# Patient Record
Sex: FEMALE | Race: WHITE | NOT HISPANIC OR LATINO | Employment: FULL TIME | URBAN - METROPOLITAN AREA
[De-identification: names, ages, dates, MRNs, and addresses within clinical notes are randomized per-mention and may not be internally consistent; named-entity substitution may affect disease eponyms.]

---

## 2021-01-14 ENCOUNTER — TRANSFERRED RECORDS (OUTPATIENT)
Dept: HEALTH INFORMATION MANAGEMENT | Facility: CLINIC | Age: 31
End: 2021-01-14
Payer: COMMERCIAL

## 2021-01-29 ENCOUNTER — TRANSFERRED RECORDS (OUTPATIENT)
Dept: HEALTH INFORMATION MANAGEMENT | Facility: CLINIC | Age: 31
End: 2021-01-29

## 2021-01-31 ENCOUNTER — TRANSFERRED RECORDS (OUTPATIENT)
Dept: HEALTH INFORMATION MANAGEMENT | Facility: CLINIC | Age: 31
End: 2021-01-31

## 2021-01-31 LAB
ALT SERPL-CCNC: 24 U/L (ref 14–63)
AST SERPL-CCNC: 12 U/L (ref 15–37)
CREATININE (EXTERNAL): 0.56 MG/DL (ref 0.51–0.95)
GFR ESTIMATED (EXTERNAL): >60 ML/MIN/1.73M^2 (ref 60–150)
GLUCOSE (EXTERNAL): 89 MG/DL (ref 74–100)
POTASSIUM (EXTERNAL): 3.9 MMOL/L (ref 3.5–5.1)

## 2021-02-04 ENCOUNTER — TRANSFERRED RECORDS (OUTPATIENT)
Dept: HEALTH INFORMATION MANAGEMENT | Facility: CLINIC | Age: 31
End: 2021-02-04

## 2021-02-09 ENCOUNTER — TRANSFERRED RECORDS (OUTPATIENT)
Dept: HEALTH INFORMATION MANAGEMENT | Facility: CLINIC | Age: 31
End: 2021-02-09

## 2021-02-11 ENCOUNTER — TRANSFERRED RECORDS (OUTPATIENT)
Dept: HEALTH INFORMATION MANAGEMENT | Facility: CLINIC | Age: 31
End: 2021-02-11

## 2021-02-11 LAB
ALT SERPL-CCNC: 23 U/L (ref 14–63)
AST SERPL-CCNC: 11 U/L (ref 15–37)

## 2021-02-21 ENCOUNTER — TRANSFERRED RECORDS (OUTPATIENT)
Dept: HEALTH INFORMATION MANAGEMENT | Facility: CLINIC | Age: 31
End: 2021-02-21

## 2021-02-21 LAB
ALT SERPL-CCNC: 22 U/L (ref 14–63)
AST SERPL-CCNC: 9 U/L (ref 15–37)
CHOLESTEROL (EXTERNAL): 159 MG/DL (ref 100–200)
CREATININE (EXTERNAL): 0.53 MG/DL (ref 0.51–0.95)
GFR ESTIMATED (EXTERNAL): >60 ML/MIN/1.73ME2
GLUCOSE (EXTERNAL): 98 MG/DL (ref 74–100)
HDLC SERPL-MCNC: 44 MG/DL (ref 45–60)
LDL CHOLESTEROL (EXTERNAL): 88 MG/DL (ref 1–129)
NON HDL CHOLESTEROL (EXTERNAL): 115 MG/DL (ref 0–130)
POTASSIUM (EXTERNAL): 4 MMOL/L (ref 3.5–5.1)
TRIGLYCERIDES (EXTERNAL): 134 MG/DL (ref 35–150)

## 2021-02-22 ENCOUNTER — TRANSFERRED RECORDS (OUTPATIENT)
Dept: HEALTH INFORMATION MANAGEMENT | Facility: CLINIC | Age: 31
End: 2021-02-22

## 2021-02-22 LAB
ALT SERPL-CCNC: 24 U/L (ref 14–63)
AST SERPL-CCNC: 11 U/L (ref 15–37)
CREATININE (EXTERNAL): 0.59 MG/DL (ref 0.51–0.95)
GFR ESTIMATED (EXTERNAL): >60 ML/MIN/1.73ME2 (ref 60–150)
GLUCOSE (EXTERNAL): 88 MG/DL (ref 74–100)
POTASSIUM (EXTERNAL): 4.2 MMOL/L (ref 3.5–5.1)

## 2021-02-23 ENCOUNTER — TRANSFERRED RECORDS (OUTPATIENT)
Dept: HEALTH INFORMATION MANAGEMENT | Facility: CLINIC | Age: 31
End: 2021-02-23

## 2021-03-03 ENCOUNTER — TRANSFERRED RECORDS (OUTPATIENT)
Dept: HEALTH INFORMATION MANAGEMENT | Facility: CLINIC | Age: 31
End: 2021-03-03

## 2021-03-03 LAB
CHOLESTEROL (EXTERNAL): 159 MG/DL (ref 100–200)
HDLC SERPL-MCNC: 47 MG/DL (ref 45–60)
LDL CHOLESTEROL (EXTERNAL): 101 MG/DL (ref 1–129)
NON HDL CHOLESTEROL (EXTERNAL): 112 MG/DL (ref 0–130)
TRIGLYCERIDES (EXTERNAL): 57 MG/DL (ref 35–150)

## 2021-03-04 ENCOUNTER — TRANSFERRED RECORDS (OUTPATIENT)
Dept: HEALTH INFORMATION MANAGEMENT | Facility: CLINIC | Age: 31
End: 2021-03-04

## 2021-03-04 LAB
ALT SERPL-CCNC: 47 U/L (ref 14–63)
AST SERPL-CCNC: 17 U/L (ref 15–37)
CREATININE (EXTERNAL): 0.58 MG/DL (ref 0.51–0.95)
GFR ESTIMATED (EXTERNAL): >60 ML/MIN/1.73M2 (ref 60–150)
GLUCOSE (EXTERNAL): 154 MG/DL (ref 74–100)
POTASSIUM (EXTERNAL): 3.5 MMOL/L (ref 3.5–5.1)

## 2021-03-15 ENCOUNTER — TRANSFERRED RECORDS (OUTPATIENT)
Dept: HEALTH INFORMATION MANAGEMENT | Facility: CLINIC | Age: 31
End: 2021-03-15

## 2021-03-24 ENCOUNTER — TRANSFERRED RECORDS (OUTPATIENT)
Dept: HEALTH INFORMATION MANAGEMENT | Facility: CLINIC | Age: 31
End: 2021-03-24

## 2021-04-01 ENCOUNTER — TRANSFERRED RECORDS (OUTPATIENT)
Dept: HEALTH INFORMATION MANAGEMENT | Facility: CLINIC | Age: 31
End: 2021-04-01

## 2021-04-01 LAB
ALT SERPL-CCNC: 15 IU/L (ref 0–55)
AST SERPL-CCNC: 13 IU/L (ref 5–34)
CREATININE (EXTERNAL): 0.72 MG/DL (ref 0.6–1.2)
GFR ESTIMATED (EXTERNAL): >60 ML/MIN/1.73M2
GFR ESTIMATED (IF AFRICAN AMERICAN) (EXTERNAL): >60 ML/MIN/1.73M2
GLUCOSE (EXTERNAL): 87 MG/DL (ref 70–110)
POTASSIUM (EXTERNAL): 4.3 MMOL/L (ref 3.5–5.1)

## 2021-04-23 ENCOUNTER — TRANSFERRED RECORDS (OUTPATIENT)
Dept: HEALTH INFORMATION MANAGEMENT | Facility: CLINIC | Age: 31
End: 2021-04-23

## 2021-05-09 ENCOUNTER — TRANSFERRED RECORDS (OUTPATIENT)
Dept: HEALTH INFORMATION MANAGEMENT | Facility: CLINIC | Age: 31
End: 2021-05-09

## 2021-05-21 ENCOUNTER — TRANSFERRED RECORDS (OUTPATIENT)
Dept: HEALTH INFORMATION MANAGEMENT | Facility: CLINIC | Age: 31
End: 2021-05-21

## 2021-05-21 LAB
ALT SERPL-CCNC: 43 U/L (ref 0–32)
AST SERPL-CCNC: 16 U/L (ref 0–40)
CREATININE (EXTERNAL): 0.36 MG/DL (ref 0.57–1)
GFR ESTIMATED (EXTERNAL): 145 ML/MIN/1.73M2
GFR ESTIMATED (IF AFRICAN AMERICAN) (EXTERNAL): 167 ML/MIN/1.73M2
GLUCOSE (EXTERNAL): 97 MG/DL (ref 65–99)
POTASSIUM (EXTERNAL): 4.6 MMOL/L (ref 3.5–5.2)

## 2021-06-09 ENCOUNTER — TRANSFERRED RECORDS (OUTPATIENT)
Dept: HEALTH INFORMATION MANAGEMENT | Facility: CLINIC | Age: 31
End: 2021-06-09

## 2021-07-13 ENCOUNTER — VIRTUAL VISIT (OUTPATIENT)
Dept: GASTROENTEROLOGY | Facility: CLINIC | Age: 31
End: 2021-07-13
Payer: COMMERCIAL

## 2021-07-13 DIAGNOSIS — R10.11 ABDOMINAL PAIN, RIGHT UPPER QUADRANT: Primary | ICD-10-CM

## 2021-07-13 DIAGNOSIS — K85.00 IDIOPATHIC ACUTE PANCREATITIS, UNSPECIFIED COMPLICATION STATUS: ICD-10-CM

## 2021-07-13 PROCEDURE — 99204 OFFICE O/P NEW MOD 45 MIN: CPT | Mod: 95 | Performed by: INTERNAL MEDICINE

## 2021-07-13 RX ORDER — TRAMADOL HYDROCHLORIDE 50 MG/1
50 TABLET ORAL EVERY 6 HOURS PRN
COMMUNITY

## 2021-07-13 NOTE — PROGRESS NOTES
Clara is a 31 year old who is being evaluated via a billable video visit.      How would you like to obtain your AVS? Mail a copy  If the video visit is dropped, the invitation should be resent by: Text to cell phone: [phone]  Will anyone else be joining your video visit? No      Video Start Time:   Video-Visit Details    Type of service:  Video Visit    Video End Time:    Originating Location (pt. Location):     Distant Location (provider location):  [facility]     Platform used for Video Visit:   Vito Sandoval CMA

## 2021-07-14 NOTE — PROGRESS NOTES
GASTROENTEROLOGY NEW PATIENT VIDEO VISIT    CC/REFERRING MD:    No Ref-Primary, Physician  Referred Self    REASON FOR CONSULTATION:   Referred Self for   Chief Complaint   Patient presents with     New Patient      Pancreatitis // pain is bad 8 // taking tramadol to take edge off-- is working a little // upper abdominal to back       HISTORY OF PRESENT ILLNESS:    Clara Conway is a 31 year old female who is being evaluated via a billable video visit.      We did a consultation for evaluation of abdominal pain.  Evaluation is limited by lack of records today from prior work-up.  She reports that she had the onset of abdominal pain and chest pain in February of last year.  She sought evaluation for this in the emergency department.  She notes that she had a slightly elevated lipase level around that time and it was around 187 with a normal range of 0-78.  CT was unremarkable.  She then had several months where she felt quite well and then again developed abdominal pain, right shoulder pain and back pain she reports that she went to the emergency department and was noted to have a lipase level of approximately 2000.  She reports that she did get a HIDA scan around that time which noted a low gallbladder ejection fraction and then she underwent a cholecystectomy.  This did not resolve the pain.  She reports that she had 2 CT scans, 2 MRCP is, 1 in [place] in 1 month [place] and these did not note any abnormality.  She had a colonoscopy and this was unremarkable.  She saw Dr. Waddell and she had an EUS which noted a mildly dilated bile duct with possibly some sludge (we do not have the report) and then was recommended to undergo ERCP and she did this but she reports she had a bile duct cleaned out and a stent placed.  She then had a follow-up and the stent was removed.  She still had pain and had another EUS and reports then she was diagnosed with mild chronic pancreatitis by EUS.  She was prescribed Creon,  dose unclear but it did not significantly improve her symptoms.  She then was referred here for further evaluation.  She continues to have abdominal pain which is constant in nature but does intermittently become worse with eating.  She occasionally has diarrhea with fatty stools and it is worse when she is having pain.  She notes that she was checked for H. pylori in the past and this was negative.  She notes that her health is otherwise good.  She does not have any other major medical issues and no other pertinent surgical history.  She does not have a family history of pancreatitis or pancreatic cancer.  She drinks minimal alcohol, none recently and formerly smoked but quit a few years back and was never a heavy smoker.  She takes tramadol for the abdominal pain but no other regular medications and no over-the-counter medications.    I have reviewed and updated the patient's Past Medical History, Social History, Family History and Medication List.    Exam:    General appearance:  Healthy appearing adult, in no acute distress  Eyes:  Sclera anicteric, Pupils round and reactive to light  Ears, nose, mouth and throat:  No obvious external lesions of ears and nose.  Hearing intact  Neck:  Symmetric, No obvious external lesions  Respiratory:  Normal respiration, no use of accessory muscles   MSK:  No visual upper extremity, neck or facial muscle atrophy  ABD:  No visual abdominal distention, no audible borborygmi  Skin:  No rashes or jaundice   Psychiatric:  Oriented to person, place and time, Appropriate mood and affect.   Neurologic:  Peripheral muscle function and dexterity appear to be intact      PERTINENT STUDIES have been reviewed.    ASSESSMENT/PLAN:    Clara Conway is a 31 year old female who presents for evaluation of chronic right upper quadrant abdominal pain with radiation to the back and the right shoulder.  This occurs in the setting of prior cholecystectomy, prior ERCP, and EUS noting mild  chronic pancreatitis per her report.  We do not have any of the prior medical records.  She notes that she has had 2 MRCP is in 2 CT scans which did not note any chronic pancreatitis.  However, she has had acute pancreatitis with lipase level greater than 2000.  We discussed today that a diagnosis of mild chronic pancreatitis by EUS can be problematic in the absence of supporting objective findings and we should try to confirm this diagnosis before attributing her abdominal pain to this over the long-term.  I suggest that we repeat basic laboratory studies with LFTs, pancreatic enzyme levels and also get a fecal elastase level at this time.  I would also like to get a secretin stimulated MRCP to see if that can show any evidence of chronic pancreatitis.  If this confirms this diagnosis, then we can manage for this over the long-term but in the absence of clear evidence, another diagnosis such as a postcholecystectomy syndrome, functional right upper quadrant pain may also be quite possible in the absence of negative work-up and thus neuromodulator type therapy, GI psychology etc. may provide more benefit than management of pancreatic enzymes and/or other pancreatic directed therapy.  We have ordered labs and the MRCP and then will follow up back in the office after we get the studies and all the results of her prior evaluations.      Video-Visit Details    Type of service:  Video Visit    Total Video Time: 32 min    Originating Location (pt. Location): Home    Distant Location (provider location):  [facility]     Mode of Communication:  Video Conference via [employer]    David Edwards MD    RTC 2 months    Thank you for this consultation.  It was a pleasure to participate in the care of this patient; please contact us with any further questions.  A total of 46 minutes was spent with reviewing the chart, discussing with the patient, documentation and coordination of  care.    This note was created with voice recognition software, and while reviewed for accuracy, typos may remain.     David Edwards MD  Division of Gastroenterology, Hepatology and Nutrition  [facility]  [phone]

## 2021-07-19 ENCOUNTER — TELEPHONE (OUTPATIENT)
Dept: GASTROENTEROLOGY | Facility: CLINIC | Age: 31
End: 2021-07-19

## 2021-07-19 NOTE — TELEPHONE ENCOUNTER
Writer called patient to discuss getting consent from [facility] and from [facility] in [place].  LVM for patient to call back.  Copy of Dr Edwards note below:      Can I get records for this patient?     She has records at [place] and [place] with CT scan, labs and MRCP     She has records at [facility] with 2 different EUS and 1 ERCP and office visit.     Thanks, MD Vito Eason, [facility]

## 2021-07-19 NOTE — TELEPHONE ENCOUNTER
Writer called [facility] and [facility] at Dr Edwards request to obtain records for patient including MRCP,labs,CT scans and any GI related records.  Writer called both labs and faxed records requests to both [place] and [place] to obtain records.  Vito Sandoval CMA

## 2021-07-20 ENCOUNTER — TELEPHONE (OUTPATIENT)
Dept: GASTROENTEROLOGY | Facility: CLINIC | Age: 31
End: 2021-07-20

## 2021-07-20 NOTE — TELEPHONE ENCOUNTER
[facility] Call Center    Phone Message    May a detailed message be left on voicemail: yes     Reason for Call: Other: Patient has a MRCP scheduled for 8-2 and wants to get in sooner if able.  Please call to discuss, as the referral was from Dr Edwards.      Action Taken: Message routed to:  [facility]  GI    Travel Screening: Not Applicable

## 2021-07-21 NOTE — TELEPHONE ENCOUNTER
Patient contacted, states that she was scheduled for 8/2/2021, but is in a lot of pain and was hoping that she would be able to be seen sooner.  Informed patient that this writer is unsure if other locations are able to do the MRCP with secretin, would need to contact other locations and inquire.  Patient is wondering if there is anything that Dr. Edwards can do to make this occur sooner.  Patient inquired if outside records have been received, informed that there are outside records scanned into her EMR.      Yue Werner RN

## 2021-07-23 NOTE — TELEPHONE ENCOUNTER
David Edwards MD  You 20 hours ago (3:48 PM)   CS  I will not be able to change the appointment for MRI.     She could see her primary care or consider an appointment with the chronic pain clinic if ongoing pain is an issue.     After MRI, I will be able to evaluate if there is anything that we will be able to intervene upon from a GI standpoint which could address pain      Attempted to reach patient, message left requesting return call.       Yue Werner RN

## 2021-07-30 NOTE — TELEPHONE ENCOUNTER
Writer received records from [facility] for patient requested by Dr Edwards. All records faxed to stat scan for Dr Edwards' review, patient has procedure Monday with Dr. Edwards.  [place] records already scanned in.  Vito Sandoval CMA

## 2021-08-02 ENCOUNTER — HOSPITAL ENCOUNTER (OUTPATIENT)
Dept: MRI IMAGING | Facility: CLINIC | Age: 31
Discharge: HOME | End: 2021-08-02
Attending: INTERNAL MEDICINE | Admitting: INTERNAL MEDICINE
Payer: COMMERCIAL

## 2021-08-02 DIAGNOSIS — K85.00 IDIOPATHIC ACUTE PANCREATITIS, UNSPECIFIED COMPLICATION STATUS: ICD-10-CM

## 2021-08-02 DIAGNOSIS — R10.11 ABDOMINAL PAIN, RIGHT UPPER QUADRANT: ICD-10-CM

## 2021-08-02 PROCEDURE — A9585 GADOBUTROL INJECTION: HCPCS | Performed by: INTERNAL MEDICINE

## 2021-08-02 PROCEDURE — 74183 MRI ABD W/O CNTR FLWD CNTR: CPT | Mod: 26 | Performed by: RADIOLOGY

## 2021-08-02 PROCEDURE — 999N000128 HC STATISTIC PERIPHERAL IV START W/O US GUIDANCE

## 2021-08-02 PROCEDURE — 250N000009 HC RX 250: Performed by: INTERNAL MEDICINE

## 2021-08-02 PROCEDURE — 74183 MRI ABD W/O CNTR FLWD CNTR: CPT

## 2021-08-02 PROCEDURE — 255N000002 HC RX 255 OP 636: Performed by: INTERNAL MEDICINE

## 2021-08-02 PROCEDURE — 250N000011 HC RX IP 250 OP 636: Performed by: INTERNAL MEDICINE

## 2021-08-02 RX ORDER — GADOBUTROL 604.72 MG/ML
10 INJECTION INTRAVENOUS ONCE
Status: COMPLETED | OUTPATIENT
Start: 2021-08-02 | End: 2021-08-02

## 2021-08-02 RX ADMIN — HUMAN SECRETIN 0.2 MCG: 16 INJECTION, POWDER, LYOPHILIZED, FOR SOLUTION INTRAVENOUS at 13:40

## 2021-08-02 RX ADMIN — HUMAN SECRETIN 15.8 MCG: 16 INJECTION, POWDER, LYOPHILIZED, FOR SOLUTION INTRAVENOUS at 13:42

## 2021-08-02 RX ADMIN — GADOBUTROL 10 ML: 604.72 INJECTION INTRAVENOUS at 14:15

## 2021-08-03 ENCOUNTER — TELEPHONE (OUTPATIENT)
Dept: GASTROENTEROLOGY | Facility: CLINIC | Age: 31
End: 2021-08-03

## 2021-08-03 NOTE — TELEPHONE ENCOUNTER
Received the below message from provider. LPN spoke with patient and notified her of results and recommendations. Patient verbalized understanding and requested that lab orders be faxed to [facility] lab. Lab rax number [phone]. Lab orders faxed with the request that results be faxed to [facility].    SHAY Knutson Christopher Dale, MD  [facility]  Can we please let the patient know her MRI results.  We don't see any definitive sign of chronic pancreatitis on this MRI.   The bile duct changes are consistent with her having her gallbladder out.  I would suggest that she get the stool studies done that we ordered in order to complete the evaluation to see if any pancreas issues could be contributing to her pain. Please let me know if any questions.     IMPRESSION:       1.  Minimal intra and extrahepatic biliary dilatation is likely   reservoir effect of post cholecystectomy status. No evidence of   obstructing mass or stone   2.  Pancreas shows no structural abnormalities and normal response to   secretin.       I have personally reviewed the examination and initial interpretation   and I agree with the findings.       SHANNAN SHAIKH, DO

## 2021-08-04 ENCOUNTER — TRANSFERRED RECORDS (OUTPATIENT)
Dept: HEALTH INFORMATION MANAGEMENT | Facility: CLINIC | Age: 31
End: 2021-08-04

## 2021-08-04 LAB
ALT SERPL-CCNC: 16 IU/L (ref 0–55)
AST SERPL-CCNC: 12 IU/L (ref 5–34)
CREATININE (EXTERNAL): 0.69 MG/DL (ref 0.5–1.2)
GFR ESTIMATED (EXTERNAL): >60 ML/MIN/1.73M2
GFR ESTIMATED (IF AFRICAN AMERICAN) (EXTERNAL): >60 ML/MIN/1.73M2
GLUCOSE (EXTERNAL): 94 MG/DL (ref 70–110)
POTASSIUM (EXTERNAL): 4.5 MMOL/L (ref 3.5–5.1)

## 2021-08-04 NOTE — TELEPHONE ENCOUNTER
No return call, MRI completed.  See encounter dated 8/4/2021 for further recommendations.      Yue Werner RN

## 2021-08-16 ENCOUNTER — TELEPHONE (OUTPATIENT)
Dept: GASTROENTEROLOGY | Facility: CLINIC | Age: 31
End: 2021-08-16

## 2021-08-16 NOTE — TELEPHONE ENCOUNTER
Writer received fax from [facility] for patient.  Records faxed to stat scan and Dr Edwards notified.  Vito Sandoval CMA

## 2021-08-20 ENCOUNTER — TELEPHONE (OUTPATIENT)
Dept: GASTROENTEROLOGY | Facility: CLINIC | Age: 31
End: 2021-08-20

## 2021-08-20 NOTE — TELEPHONE ENCOUNTER
[facility] Call Center    Phone Message    May a detailed message be left on voicemail: yes     Reason for Call: Other: Per pt is just wondering if  or care team have recieved the results of her labs and tests. Please call pt back once recieve. Thank you.      Action Taken: Message routed to:  [facility] Gastro    Travel Screening: Not Applicable

## 2021-08-25 NOTE — TELEPHONE ENCOUNTER
Grace, David Yañez MD  You 2 days ago   CS  I have reviewed all of the data.  The stool tests and the blood work appear normal, no sign of pancreatic insufficiency or pancreatitis.  The MRI here also did not reveal any sign of chronic pancreatitis.       I do not think there is a pancreatic cause of the abdominal pain.  If she wants, we can do a follow up visit to discuss medication/management options for the chronic abdominal pain.     David Edwards MD      Detailed voice mail left for patient with provider advice.   Requested return call if questions or concerns regarding this information.     Yue Werner RN

## 2021-09-22 ENCOUNTER — TRANSCRIBE ORDERS (OUTPATIENT)
Dept: OTHER | Age: 31
End: 2021-09-22

## 2021-09-22 DIAGNOSIS — R10.10 CHRONIC UPPER ABDOMINAL PAIN: Primary | ICD-10-CM

## 2021-09-22 DIAGNOSIS — G89.29 CHRONIC UPPER ABDOMINAL PAIN: Primary | ICD-10-CM

## 2021-09-28 ENCOUNTER — TELEPHONE (OUTPATIENT)
Dept: GASTROENTEROLOGY | Facility: CLINIC | Age: 31
End: 2021-09-28

## 2021-09-28 NOTE — TELEPHONE ENCOUNTER
"[facility]     Referring provider:  Carman Melanie Santiago    Referred to: [facility]     Provider Requested:  Dr. Randall    Referral Received:  9/23/21     Records received: in CareEverywhere     Images received: sMRCP in PACs, read in Epic    Evaluation for: abd pain     Clinical History (per RN review):     Pt saw Dr Coronado's in August, his follow up response via [employer]:  I have reviewed all of the data.  The stool tests and the blood work appear normal, no sign of pancreatic insufficiency or pancreatitis.  The MRI here also did not reveal any sign of chronic pancreatitis.       I do not think there is a pancreatic cause of the abdominal pain.  If she wants, we can do a follow up visit to discuss medication/management options for the chronic abdominal pain.     Referring MD now referring to Dr. Randall, specifically requesting you comment on pain management.     \"Ask Dr. Randall if we need to increase your amitriptyline, or if he wants me to add Cymbalta or if he wants to do it. \"    Also saw Amanuel Knott in July    Mild elevations of lipase in the past:  08/04/2021 04/29/2021 04/17/2021 04/09/2021 04/01/2021 03/04/2021 12/15/2020 11/24/2020 08/18/2020 03/04/2020 02/05/2020 08/30/2019                 LIPASE 82.0High     121.0High     103.0High     126.0High     160.0High     312.0High     117.0High                 MD review date: 9/28/21  MD Decision for clinic consultation/Orders:            Referral updates/Patient contacted:     "

## 2021-10-01 ENCOUNTER — TELEPHONE (OUTPATIENT)
Dept: GASTROENTEROLOGY | Facility: CLINIC | Age: 31
End: 2021-10-01

## 2021-10-01 NOTE — TELEPHONE ENCOUNTER
LVM (no mychart). GI Referral, schedule in [facility]bili:  MD:Dr. Randall   Virtual visit  Referring MD:Melanie Santacruz   RE: functional abdominal pain, elevated lipase

## 2021-11-10 ENCOUNTER — VIRTUAL VISIT (OUTPATIENT)
Dept: GASTROENTEROLOGY | Facility: CLINIC | Age: 31
End: 2021-11-10
Attending: PHYSICIAN ASSISTANT
Payer: COMMERCIAL

## 2021-11-10 VITALS — HEIGHT: 70 IN | BODY MASS INDEX: 30.92 KG/M2 | WEIGHT: 216 LBS

## 2021-11-10 DIAGNOSIS — R10.10 CHRONIC UPPER ABDOMINAL PAIN: ICD-10-CM

## 2021-11-10 DIAGNOSIS — G89.29 CHRONIC UPPER ABDOMINAL PAIN: ICD-10-CM

## 2021-11-10 PROCEDURE — 99215 OFFICE O/P EST HI 40 MIN: CPT | Mod: 95 | Performed by: INTERNAL MEDICINE

## 2021-11-10 ASSESSMENT — MIFFLIN-ST. JEOR: SCORE: 1780.02

## 2021-11-10 NOTE — LETTER
11/10/2021         RE: Clara Chávez  [id number] [address] [place] [address]        Dear Colleague,    Thank you for referring your patient, Clara Chávez, to the [facility]. Please see a copy of my visit note below.    Clara is a 31 year old who is being evaluated via a billable video visit.      How would you like to obtain your AVS? Mail a copy  If the video visit is dropped, the invitation should be resent by: Text to cell phone: [phone]  Will anyone else be joining your video visit? No     Video Start Time: 11:20 AM  Video-Visit Details    Type of service:  Video Visit       Start: 11/10/2021 10:29 am   Stop: 11/10/2021 11:20 am  Originating Location (pt. Location):Car    Distant Location (provider location):  [facility]     Platform used for Video Visit: AmWell     Referred at own request by Dr Edwards, followed by Melanie Mack PA-C her primary. Has seen Dr Waddell (EUS, ERCP x 2), then Dr Edwards at our own [facility] clinic. Excellent note from Dr Edwards below.    Related to LIPASE  Component      LIPASE     Component 08/04/2021 04/29/2021 04/17/2021 04/09/2021 04/01/2021 03/04/2021 12/15/2020 11/24/2020 08/18/2020 03/04/2020 02/05/2020 08/30/2019                  LIPASE 82.0 High     121.0 High     103.0 High     126.0 High     160.0 High     312.0 High     117.0 High     203.0 High             Feb 2021 Lipase [facility] 2 nights.  Multiple ED visits  Nov 2020, pain, persistent pain since  Pain radiates to back, right side..  Nonfunctioning HIDA, gallbladder Jan 2021.   No relief  EUS, ERCP, sphincterotomies, no improvement, now worse.   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        IMP: at least two episodes cw acute pancreatitis, per Benson criteria, w pain and >3x uln, maybe more. All  imaging normal. Equivocal EUS at [facility] unreliable test and not available to us. Persistent troubling pain, that radiates to back, RUQ. No response or worse after wanda then ERCPs, during which we dont know what was done, but probably sphincterotomies. No complication, at least.    This is possibly consistent w chronic pancreatic pain, which can occur after documented bouts of acute pancreatitis, in absence of structural changes or chronic pancreatitis, or EPI (had normal elastase). All work up by Dr Edwards srinivasan secretin MRCP exactly appropriate. May have genetic  (SPINK1, CFTR etc) which would help explain. Whatever cause will offer her supportive care, pain clinic referral, IV fluid therapy, health psych. Ultimately may discuss repeat ERCP to see if sphincters actually ablated.       1) No more CT scans  2) Infusion therapy 3 x [place]  3) Dietitian  4) Health Psycholgy  5) Genetic eval Emily Ortiz  6) Follow-up Dr Randall   7) Pain referral to Dr PARMJIT Shell  8) Carafate 1gr qid  9) Gabapentin or Lyrica per Melanie Randall M.D., [facility] [place]  Professor of Medicine   Division of Gastroenterology, Hepatology and Nutrition   [facility] [address]  [address] [place] [address]  Study Result    Narrative & Impression   EXAMINATION: MR ABDOMEN MRCP WO & W CONTRAST W SECRETIN,  8/2/2021 2:16 PM     COMPARISON: None     HISTORY: reported history of chronic pancreatitis by outside EUS.   please do secretin stimulated mrcp to look for chronic pancreatitis;  Abdominal pain, right upper quadrant; Idiopathic acute pancreatitis,  unspecified complication status     TECHNIQUE: Coronal T2 HASTE, sagittal T2 HASTE, axial T2 STIR, axial  ROBYN T2, In-phase and Out-of-phase axial breath-hold FLASH,  diffusion-weighted, and T1-weighted VIBE axial fat saturation images  were obtained. Thick and thin slab heavily T2-weighted MRCP images  were obtained. Following administration of mcg of secretin,  T2-weighted  HASTE images were obtained at 1 minute intervals to 7  minutes, and an additional 10 minutes image was also obtained.  Contrast dose: 10mL Gadavist     FINDINGS:      Gallbladder/Biliary Tree: Status post cholecystectomy. Common bile  duct measures up to 9 mm without evidence of obstructing mass or  stone. There is minimal intrahepatic biliary dilatation..      Pancreas: Normal T1 parenchymal signal. There is no pancreas divisum.  The pancreatic duct  does not appear abnormally dilated, and  no  sidebranches are visualized . The ventral pancreatic duct is not well  seen. No focal lesions are identified in the pancreas. There is  minimal dilatation of the pancreatic duct after secretin, and  subsequent increased fluid signal in the duodenum.     Liver: Unremarkable. No suspicious masses. No hepatic steatosis.      Spleen: Unremarkable.     Adrenal glands: Unremarkable.     Kidneys: Unremarkable. No hydronephrosis.     Bowel: Unremarkable. No obstruction.     Lymph nodes: No bulky lymphadenopathy.     Blood vessels: Unremarkable.     Lung bases: Unremarkable.     Bones and soft tissues: Unremarkable. No suspicious lesions.     Mesentery and abdominal wall: Unremarkable.     Ascites: None.                                                                      IMPRESSION:     1.  Minimal intra and extrahepatic biliary dilatation is likely  reservoir effect of post cholecystectomy status. No evidence of  obstructing mass or stone   2.  Pancreas shows no structural abnormalities and normal response to  secretin.     I have personally reviewed the examination and initial interpretation  and I agree with the findings.     SHANNAN SHAIKH, DO        GASTROENTEROLOGY NEW PATIENT VIDEO VISIT     CC/REFERRING MD:    No Ref-Primary, Physician  Referred Self     REASON FOR CONSULTATION:   Referred Self for        Chief Complaint   Patient presents with     New Patient        Pancreatitis // pain is bad 8 // taking tramadol to take  edge off-- is working a little // upper abdominal to back         HISTORY OF PRESENT ILLNESS:     Clara Conway is a 31 year old female who is being evaluated via a billable video visit.       We did a consultation for evaluation of abdominal pain.  Evaluation is limited by lack of records today from prior work-up.  She reports that she had the onset of abdominal pain and chest pain in February of last year.  She sought evaluation for this in the emergency department.  She notes that she had a slightly elevated lipase level around that time and it was around 187 with a normal range of 0-78.  CT was unremarkable.  She then had several months where she felt quite well and then again developed abdominal pain, right shoulder pain and back pain she reports that she went to the emergency department and was noted to have a lipase level of approximately 2000.  She reports that she did get a HIDA scan around that time which noted a low gallbladder ejection fraction and then she underwent a cholecystectomy.  This did not resolve the pain.  She reports that she had 2 CT scans, 2 MRCP is, 1 in [place] in 1 month [place] and these did not note any abnormality.  She had a colonoscopy and this was unremarkable.  She saw Dr. Waddell and she had an EUS which noted a mildly dilated bile duct with possibly some sludge (we do not have the report) and then was recommended to undergo ERCP and she did this but she reports she had a bile duct cleaned out and a stent placed.  She then had a follow-up and the stent was removed.  She still had pain and had another EUS and reports then she was diagnosed with mild chronic pancreatitis by EUS.  She was prescribed Creon, dose unclear but it did not significantly improve her symptoms.  She then was referred here for further evaluation.  She continues to have abdominal pain which is constant in nature but does intermittently become worse with eating.  She occasionally has diarrhea with fatty  stools and it is worse when she is having pain.  She notes that she was checked for H. pylori in the past and this was negative.  She notes that her health is otherwise good.  She does not have any other major medical issues and no other pertinent surgical history.  She does not have a family history of pancreatitis or pancreatic cancer.  She drinks minimal alcohol, none recently and formerly smoked but quit a few years back and was never a heavy smoker.  She takes tramadol for the abdominal pain but no other regular medications and no over-the-counter medications.     I have reviewed and updated the patient's Past Medical History, Social History, Family History and Medication List.     Exam:    General appearance:  Healthy appearing adult, in no acute distress  Eyes:  Sclera anicteric, Pupils round and reactive to light  Ears, nose, mouth and throat:  No obvious external lesions of ears and nose.  Hearing intact  Neck:  Symmetric, No obvious external lesions  Respiratory:  Normal respiration, no use of accessory muscles   MSK:  No visual upper extremity, neck or facial muscle atrophy  ABD:  No visual abdominal distention, no audible borborygmi  Skin:  No rashes or jaundice   Psychiatric:  Oriented to person, place and time, Appropriate mood and affect.   Neurologic:  Peripheral muscle function and dexterity appear to be intact        PERTINENT STUDIES have been reviewed.     ASSESSMENT/PLAN:     Clara Conway is a 31 year old female who presents for evaluation of chronic right upper quadrant abdominal pain with radiation to the back and the right shoulder.  This occurs in the setting of prior cholecystectomy, prior ERCP, and EUS noting mild chronic pancreatitis per her report.  We do not have any of the prior medical records.  She notes that she has had 2 MRCP is in 2 CT scans which did not note any chronic pancreatitis.  However, she has had acute pancreatitis with lipase level greater than 2000.  We  discussed today that a diagnosis of mild chronic pancreatitis by EUS can be problematic in the absence of supporting objective findings and we should try to confirm this diagnosis before attributing her abdominal pain to this over the long-term.  I suggest that we repeat basic laboratory studies with LFTs, pancreatic enzyme levels and also get a fecal elastase level at this time.  I would also like to get a secretin stimulated MRCP to see if that can show any evidence of chronic pancreatitis.  If this confirms this diagnosis, then we can manage for this over the long-term but in the absence of clear evidence, another diagnosis such as a postcholecystectomy syndrome, functional right upper quadrant pain may also be quite possible in the absence of negative work-up and thus neuromodulator type therapy, GI psychology etc. may provide more benefit than management of pancreatic enzymes and/or other pancreatic directed therapy.  We have ordered labs and the MRCP and then will follow up back in the office after we get the studies and all the results of her prior evaluations.        Video-Visit Details     Type of service:  Video Visit     Total Video Time: 32 min    Originating Location (pt. Location): Home     Distant Location (provider location):  [facility]      Mode of Communication:  Video Conference via [email or web address]     David Edwards MD     RTC 2 months     Thank you for this consultation.  It was a pleasure to participate in the care of this patient; please contact us with any further questions.  A total of 46 minutes was spent with reviewing the chart, discussing with the patient, documentation and coordination of care.     This note was created with voice recognition software, and while reviewed for accuracy, typos may remain.      David Edwards MD  Division of Gastroenterology, Hepatology and Nutrition  [facility]  [phone]                Again, thank you for allowing me to participate in the care of your patient.        Sincerely,        Abel Randall MD

## 2021-11-10 NOTE — PROGRESS NOTES
Clara is a 31 year old who is being evaluated via a billable video visit.      How would you like to obtain your AVS? Mail a copy  If the video visit is dropped, the invitation should be resent by: Text to cell phone: [phone]  Will anyone else be joining your video visit? No     Video Start Time: 11:20 AM  Video-Visit Details    Type of service:  Video Visit       Start: 11/10/2021 10:29 am   Stop: 11/10/2021 11:20 am  Originating Location (pt. Location):Car    Distant Location (provider location):  [facility] [place]     Platform used for Video Visit: Manda     Referred at own request by Dr Edwards, followed by Melanie Mack PA-C her primary. Has seen Dr Waddell (EUS, ERCP x 2), then Dr Edwards at our own [facility] clinic. Excellent note from Dr Edwards below.    Related to LIPASE  Component      LIPASE     Component 08/04/2021 04/29/2021 04/17/2021 04/09/2021 04/01/2021 03/04/2021 12/15/2020 11/24/2020 08/18/2020 03/04/2020 02/05/2020 08/30/2019                  LIPASE 82.0 High     121.0 High     103.0 High     126.0 High     160.0 High     312.0 High     117.0 High     203.0 High             Feb 2021 Lipase 2000 [facility] 2 nights.  Multiple ED visits  Nov 2020, pain, persistent pain since  Pain radiates to back, right side..  Nonfunctioning HIDA, gallbladder Jan 2021.   No relief  EUS, ERCP, sphincterotomies, no improvement, now worse.   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        IMP: at least two episodes cw acute pancreatitis, per Meka criteria, w pain and >3x uln, maybe more. All imaging normal. Equivocal EUS at [facility] unreliable test and not available to us. Persistent troubling pain, that radiates to back, RUQ. No response or worse after wanda then ERCPs, during which we dont know what was done, but probably sphincterotomies. No complication, at  least.    This is possibly consistent w chronic pancreatic pain, which can occur after documented bouts of acute pancreatitis, in absence of structural changes or chronic pancreatitis, or EPI (had normal elastase). All work up by Dr Edwards srinivasan secretin MRCP exactly appropriate. May have genetic  (SPINK1, CFTR etc) which would help explain. Whatever cause will offer her supportive care, pain clinic referral, IV fluid therapy, health psych. Ultimately may discuss repeat ERCP to see if sphincters actually ablated.       1) No more CT scans  2) Infusion therapy 3 x [place]  3) Dietitian  4) Health Psycholgy  5) Genetic eval Emily Ortiz  6) Follow-up Dr Randall   7) Pain referral to Dr PARMJIT Shell  8) Carafate 1gr qid  9) Gabapentin or Lyrica per Melanie Randall M.D., [facility] [place], [place]  Professor of Medicine   Division of Gastroenterology, Hepatology and Nutrition   [facility] [address]  [address] [place] [id number]  Study Result    Narrative & Impression   EXAMINATION: MR ABDOMEN MRCP WO & W CONTRAST W SECRETIN,  8/2/2021 2:16 PM     COMPARISON: None     HISTORY: reported history of chronic pancreatitis by outside EUS.   please do secretin stimulated mrcp to look for chronic pancreatitis;  Abdominal pain, right upper quadrant; Idiopathic acute pancreatitis,  unspecified complication status     TECHNIQUE: Coronal T2 HASTE, sagittal T2 HASTE, axial T2 STIR, axial  ROBYN T2, In-phase and Out-of-phase axial breath-hold FLASH,  diffusion-weighted, and T1-weighted VIBE axial fat saturation images  were obtained. Thick and thin slab heavily T2-weighted MRCP images  were obtained. Following administration of mcg of secretin,  T2-weighted HASTE images were obtained at 1 minute intervals to 7  minutes, and an additional 10 minutes image was also obtained.  Contrast dose: 10mL Gadavist     FINDINGS:      Gallbladder/Biliary Tree: Status post cholecystectomy. Common bile  duct measures up to 9 mm without evidence  of obstructing mass or  stone. There is minimal intrahepatic biliary dilatation..      Pancreas: Normal T1 parenchymal signal. There is no pancreas divisum.  The pancreatic duct  does not appear abnormally dilated, and  no  sidebranches are visualized . The ventral pancreatic duct is not well  seen. No focal lesions are identified in the pancreas. There is  minimal dilatation of the pancreatic duct after secretin, and  subsequent increased fluid signal in the duodenum.     Liver: Unremarkable. No suspicious masses. No hepatic steatosis.      Spleen: Unremarkable.     Adrenal glands: Unremarkable.     Kidneys: Unremarkable. No hydronephrosis.     Bowel: Unremarkable. No obstruction.     Lymph nodes: No bulky lymphadenopathy.     Blood vessels: Unremarkable.     Lung bases: Unremarkable.     Bones and soft tissues: Unremarkable. No suspicious lesions.     Mesentery and abdominal wall: Unremarkable.     Ascites: None.                                                                      IMPRESSION:     1.  Minimal intra and extrahepatic biliary dilatation is likely  reservoir effect of post cholecystectomy status. No evidence of  obstructing mass or stone   2.  Pancreas shows no structural abnormalities and normal response to  secretin.     I have personally reviewed the examination and initial interpretation  and I agree with the findings.     SHANNAN SHAIKH, DO        GASTROENTEROLOGY NEW PATIENT VIDEO VISIT     CC/REFERRING MD:    No Ref-Primary, Physician  Referred Self     REASON FOR CONSULTATION:   Referred Self for        Chief Complaint   Patient presents with     New Patient        Pancreatitis // pain is bad 8 // taking tramadol to take edge off-- is working a little // upper abdominal to back         HISTORY OF PRESENT ILLNESS:     Clara Conway is a 31 year old female who is being evaluated via a billable video visit.       We did a consultation for evaluation of abdominal pain.  Evaluation is limited  by lack of records today from prior work-up.  She reports that she had the onset of abdominal pain and chest pain in February of last year.  She sought evaluation for this in the emergency department.  She notes that she had a slightly elevated lipase level around that time and it was around 187 with a normal range of 0-78.  CT was unremarkable.  She then had several months where she felt quite well and then again developed abdominal pain, right shoulder pain and back pain she reports that she went to the emergency department and was noted to have a lipase level of approximately 2000.  She reports that she did get a HIDA scan around that time which noted a low gallbladder ejection fraction and then she underwent a cholecystectomy.  This did not resolve the pain.  She reports that she had 2 CT scans, 2 MRCP is, 1 in [place] in 1 month [place] and these did not note any abnormality.  She had a colonoscopy and this was unremarkable.  She saw Dr. Waddell and she had an EUS which noted a mildly dilated bile duct with possibly some sludge (we do not have the report) and then was recommended to undergo ERCP and she did this but she reports she had a bile duct cleaned out and a stent placed.  She then had a follow-up and the stent was removed.  She still had pain and had another EUS and reports then she was diagnosed with mild chronic pancreatitis by EUS.  She was prescribed Creon, dose unclear but it did not significantly improve her symptoms.  She then was referred here for further evaluation.  She continues to have abdominal pain which is constant in nature but does intermittently become worse with eating.  She occasionally has diarrhea with fatty stools and it is worse when she is having pain.  She notes that she was checked for H. pylori in the past and this was negative.  She notes that her health is otherwise good.  She does not have any other major medical issues and no other pertinent surgical history.  She does not  have a family history of pancreatitis or pancreatic cancer.  She drinks minimal alcohol, none recently and formerly smoked but quit a few years back and was never a heavy smoker.  She takes tramadol for the abdominal pain but no other regular medications and no over-the-counter medications.     I have reviewed and updated the patient's Past Medical History, Social History, Family History and Medication List.     Exam:    General appearance:  Healthy appearing adult, in no acute distress  Eyes:  Sclera anicteric, Pupils round and reactive to light  Ears, nose, mouth and throat:  No obvious external lesions of ears and nose.  Hearing intact  Neck:  Symmetric, No obvious external lesions  Respiratory:  Normal respiration, no use of accessory muscles   MSK:  No visual upper extremity, neck or facial muscle atrophy  ABD:  No visual abdominal distention, no audible borborygmi  Skin:  No rashes or jaundice   Psychiatric:  Oriented to person, place and time, Appropriate mood and affect.   Neurologic:  Peripheral muscle function and dexterity appear to be intact        PERTINENT STUDIES have been reviewed.     ASSESSMENT/PLAN:     Clara Conway is a 31 year old female who presents for evaluation of chronic right upper quadrant abdominal pain with radiation to the back and the right shoulder.  This occurs in the setting of prior cholecystectomy, prior ERCP, and EUS noting mild chronic pancreatitis per her report.  We do not have any of the prior medical records.  She notes that she has had 2 MRCP is in 2 CT scans which did not note any chronic pancreatitis.  However, she has had acute pancreatitis with lipase level greater than 2000.  We discussed today that a diagnosis of mild chronic pancreatitis by EUS can be problematic in the absence of supporting objective findings and we should try to confirm this diagnosis before attributing her abdominal pain to this over the long-term.  I suggest that we repeat basic laboratory  studies with LFTs, pancreatic enzyme levels and also get a fecal elastase level at this time.  I would also like to get a secretin stimulated MRCP to see if that can show any evidence of chronic pancreatitis.  If this confirms this diagnosis, then we can manage for this over the long-term but in the absence of clear evidence, another diagnosis such as a postcholecystectomy syndrome, functional right upper quadrant pain may also be quite possible in the absence of negative work-up and thus neuromodulator type therapy, GI psychology etc. may provide more benefit than management of pancreatic enzymes and/or other pancreatic directed therapy.  We have ordered labs and the MRCP and then will follow up back in the office after we get the studies and all the results of her prior evaluations.        Video-Visit Details     Type of service:  Video Visit     Total Video Time: 32 min    Originating Location (pt. Location): Home     Distant Location (provider location):  [facility]      Mode of Communication:  Video Conference via [employer]     David Edwards MD     RTC 2 months     Thank you for this consultation.  It was a pleasure to participate in the care of this patient; please contact us with any further questions.  A total of 46 minutes was spent with reviewing the chart, discussing with the patient, documentation and coordination of care.     This note was created with voice recognition software, and while reviewed for accuracy, typos may remain.      David Edwards MD  Division of Gastroenterology, Hepatology and Nutrition  [facility]  [phone]

## 2021-11-10 NOTE — NURSING NOTE
"Chief Complaint   Patient presents with     New Patient       Vitals:    11/10/21 0940   Weight: 98 kg (216 lb)   Height: 1.778 m (5' 10\")       Body mass index is 30.99 kg/m .    Ro Lynn CMA    "

## 2021-11-11 ENCOUNTER — TELEPHONE (OUTPATIENT)
Dept: CONSULT | Facility: CLINIC | Age: 31
End: 2021-11-11
Payer: COMMERCIAL

## 2021-11-11 ENCOUNTER — PATIENT OUTREACH (OUTPATIENT)
Dept: GASTROENTEROLOGY | Facility: CLINIC | Age: 31
End: 2021-11-11
Payer: COMMERCIAL

## 2021-11-11 PROBLEM — G89.29 CHRONIC UPPER ABDOMINAL PAIN: Status: ACTIVE | Noted: 2021-11-11

## 2021-11-11 PROBLEM — R10.10 CHRONIC UPPER ABDOMINAL PAIN: Status: ACTIVE | Noted: 2021-11-11

## 2021-11-11 RX ORDER — HEPARIN SODIUM (PORCINE) LOCK FLUSH IV SOLN 100 UNIT/ML 100 UNIT/ML
5 SOLUTION INTRAVENOUS
Status: CANCELLED | OUTPATIENT
Start: 2021-11-11

## 2021-11-11 RX ORDER — ONDANSETRON 2 MG/ML
4 INJECTION INTRAMUSCULAR; INTRAVENOUS ONCE
Status: CANCELLED | OUTPATIENT
Start: 2021-11-11 | End: 2021-11-11

## 2021-11-11 RX ORDER — HEPARIN SODIUM,PORCINE 10 UNIT/ML
5 VIAL (ML) INTRAVENOUS
Status: CANCELLED | OUTPATIENT
Start: 2021-11-11

## 2021-11-11 RX ORDER — SUCRALFATE 1 G/1
1 TABLET ORAL
Qty: 360 TABLET | Refills: 3 | Status: SHIPPED | OUTPATIENT
Start: 2021-11-11

## 2021-11-11 NOTE — TELEPHONE ENCOUNTER
Patient called back regarding next steps in POC after clinic on 11/10. Silkt link sent, she will try to get records from prior care at [facility].     Pt wants to get infusions at   [facility] [address], [place] [address]-   [phone]    Infusion Center- they do not take orders from MD outside their system, pt updated, she will f/ up with her PCP to see if the will order infusion to be done locally. Pt does not want to come to [facility] for treatment.    ML

## 2021-11-11 NOTE — PATIENT INSTRUCTIONS
Follow up:    Dr. Randall has outlined the following steps after your recent clinic visit:    1) No more CT scans    2) Infusion therapy 3 x weekly as needed in [place]. Please advise where we can send these orders for you to do locally.     Outpatient Infusion Instructions  1.  Call for Infusions when you are experiencing an increase in your symptoms of pain and nausea, especially when you are having trouble keeping down food because of your symptoms.    2. Infusions are set up to be utilized up to three times weekly as needed.  Zofran or Phenergan for nausea is also available for administration during the infusion appointments.  3. Call Infusion Centers for an appointment that fits your schedule.  Please refer to the list of Outpatient Infusion Centers or call around to local Infusion Centers outside of the [facility].  I can fax orders to outside facilities, if needed, but orders need to be in one entity only.  4. The goal is to keep you out of the ER or to recover more quickly, but please remember to GO TO THE ER if you are experiencing fevers over 101, chills, vomiting multiple times per day more than 1-2 days, intractable, disabling pain that isn t controlled with your current pain management regimen, no relief from the outpatient infusions, or if you feel that your symptoms are too significant to control in an outpatient setting.      3) Dietitian, our team will reach out to coordinate.    4) Health Psycholgy- orders have been placed, [phone] to schedule    5) Genetic erika Ortiz, they will reach out to schedule    6) Follow-up Dr Randall as needed    7) Pain referral to Dr PARMJIT Shell- orders have been placed, they will reach out to schedule.     8) Carafate, 1 gram, 4 times daily. This medication as been sent to your preferred pharmacy.    9) Gabapentin or Lyrica per Melanie Mack        Please call with any questions or concerns regarding your clinic visit today.    It is a pleasure being  involved in your health care.    Contacts post-consultation depending on your need:    Schedule Clinic Appointments            [phone] # 1   M-F 7:30 - 5 pm    Ivonne Arias RN Care Coordinator  [phone]    Porter Smalls LPN    [phone]     OR Procedure Scheduling                             [phone]    My Chart is available 24 hours a day and is a secure way to access your records and communicate with your care team.  I strongly recommend signing up if you haven't already done so, if you are comfortable with computers.  If you would like to inquire about this or are having problems with My Chart access, you may call [phone] or go online at [email or web address].  Please allow at least 24 hours for a response and extra time on weekends and Holidays.

## 2021-11-18 NOTE — TELEPHONE ENCOUNTER
Spoke with patient and assisted in scheduling appointment.     Future Appointments   Date Time Provider Department Center   12/3/2021  8:00 AM Nayeli Ortiz [facility]

## 2021-12-03 ENCOUNTER — VIRTUAL VISIT (OUTPATIENT)
Dept: CONSULT | Facility: CLINIC | Age: 31
End: 2021-12-03
Attending: GENETIC COUNSELOR, MS
Payer: COMMERCIAL

## 2021-12-03 DIAGNOSIS — R74.8 ELEVATED LIPASE: ICD-10-CM

## 2021-12-03 DIAGNOSIS — Z71.83 ENCOUNTER FOR NONPROCREATIVE GENETIC COUNSELING: ICD-10-CM

## 2021-12-03 DIAGNOSIS — G89.29 CHRONIC UPPER ABDOMINAL PAIN: Primary | ICD-10-CM

## 2021-12-03 DIAGNOSIS — R10.10 CHRONIC UPPER ABDOMINAL PAIN: Primary | ICD-10-CM

## 2021-12-03 DIAGNOSIS — K85.80 OTHER ACUTE PANCREATITIS, UNSPECIFIED COMPLICATION STATUS: ICD-10-CM

## 2021-12-03 PROCEDURE — 96040 HC GENETIC COUNSELING, EACH 30 MINUTES: CPT | Mod: TEL,95 | Performed by: GENETIC COUNSELOR, MS

## 2021-12-03 NOTE — PROGRESS NOTES
Clara is a 31 year old who is being evaluated via a billable telephone visit.      What phone number would you like to be contacted at? [phone]  How would you like to obtain your AVS? Mail a copy  Phone call duration: 60 minutes  Lisbeth Chance, EMT

## 2021-12-03 NOTE — Clinical Note
12/3/2021      RE: Clara Chávez  [id number] [address] [id number]       Clara is a 31 year old who is being evaluated via a billable telephone visit.      What phone number would you like to be contacted at? [phone]  How would you like to obtain your AVS? Mail a copy  Phone call duration: 60 minutes  ELOY Tate    Genetic Counseling Consultation  This note is a summary of Clara ISRAEL Kyler s ***virtual visit to [facility] on December 3, 2021. She was referred to our clinic by *** for genetic testing due to a history of recurrent acute and chronic pancreatitis. Genetic testing and a genetic counseling consultation was recommended to aid in better understanding the cause of pancreatitis, as well as provide additional information helpful in medical management and genetic risks for family members.     Summary of visit:  1. Genetics of pancreatitis were discussed, including known involved genes, inheritance patterns, and impact on family members.    2.Genetic testing options were discussed, and ***PancreasDx panel testing through [employer] was ordered through [employer] portal. Clara can initiate the testing process through logging in to the portal. Once insurance is verified and testing commences, results will be available in approximately 4-6 weeks. I will call her at that time. ***Pancreatitis Panel was ordered through [email or web address]. They will complete an insurance verification process and initiate testing. Results will take 3-5 weeks.   3. Contact information was given for future questions or concerns that arise.    Personal Medical History:  *** Excessive use of alcohol or other triggering factors is denied and work-up for pancreatitis appears to be otherwise negative for known causative factors.    Family History:  A three generation pedigree was obtained today and sent to be scanned into the EMR. The family history was significant for the  following:    ***    The family history was negative for known individuals with diagnosed pancreatitis, pancreatic cancer, known genetic conditions, and cystic fibrosis/CF symptoms.@ is of ancestry on her maternal side and *** ancestry on her paternal side. Consanguinity was denied.    General Genetic Background Information  Genes are the instruction code for our body, and tell our body how to grow and function. Our genes are in every cell of our body, and are packaged in our chromosomes.  Genes and chromosomes come in pairs. We inherit one copy out of each pair from our mother and one copy of each pair from our father. No one has control over which copy they pass on to their child since it is a random process.   Every person has two copies of each gene.     Genetics of Pancreatitis  There are many known factors for pancreatitis, including genetic factors. Knowns and unknowns about these causes of pancreatitis, focusing on hereditary pancreatitis, were discussed today. Hereditary pancreatitis can be defined based on family history criteria or on genetic findings in an individual. One of the genes that is causative and associated with high risk for pancreatitis is PRSS1. This gene can also cause an increased risk for pancreatic cancer.  Many other genes, such as SPINK1, CTRC, CPA1, CaSR, and others are thought to be moderate risk genes and have an association with pancreatitis. Additionally idiopathic pancreatitis has been found to be associated with changes in the gene for cystic fibrosis (CFTR) as well. The variability in the inheritance of genes associated with hereditary pancreatitis was discussed.      Typical hereditary pancreatitis is a disease that is primarily inherited in an autosomal dominant manner, meaning that a change in one copy of a gene, like PRSS1, is needed to develop the condition. One other gene known to be associated with hereditary pancreatitis, CFTR, typically causes disease when inherited in  an autosomal recessive manner, meaning that an individual must inherit a change in the CFTR gene from both their mother and father. The risk factor genes are also inherited in an autosomal dominant fashion, but with these genes most individuals that carry a change do not get pancreatitis. We discussed the complexity of genetic testing for hereditary pancreatitis and that further discussion would be needed when we have the results.    If a known disease causing change was found, this would carry up to a 50% potential risk for future children and siblings to inherit the genetic change, and a 50% chance of not inheriting it. Not all individuals with genetic changes in these genes go on to develop pancreatitis, and each gene is associated with a different risk for the development of pancreatitis. If a change is found in the CFTR gene, this result would have additional implications (such as chance for cystic fibrosis in an individual and/or their children) and they will be discussed further if applicable. Brief information about cystic fibrosis, its associated symptoms (lung infections and complications, pancreatitis, sinus disease, and/or male infertility), and its variability were discussed today. If a specific genetic variant in a family can be identified, more information about risk for other family members and familial testing would be available.    Genetic Testing  There are genetic saliva or blood tests available that can help determine if an individual has changes in genes associated with pancreatitis (including PRSS1, SPINK1, CTRC, and CFTR genes). As the cause of Clara's pancreatitis is unclear ***and a genetic factor suspected, comprehensive genetic testing will give useful information to aid in directing medical management, reproductive risks, and family member risks. Specifically, if a underlying explanation forLiliana's pancreatis can be found, it may give more information about how to appropriate manage her or  give information about what to expect. In addition, it is possible an underlying genetic cause may predisposeLiliana to other health risks. Knowing about these additional health risks can help us stay ahead of healthcare to more appropriately screen for and potentially prevent other complications. Lastly, discovering an underlying reason may help predict the chance for other family members to have pancreatitis and/or other genetic conditions, such as cystic fibrosis. ***GivenLiliana's young age of onset for her pancreatitis and additional symptoms, genetic testing assessing hereditary factors is especially beneficial and medically necessary for Clara.    Limitations and risks of genetic testing were also discussed, including that our genetic testing in 2021 is only as good as our current knowledge of genetics and genes. Therefore, a negative test result does not rule out all genetic changes and causes of pancreatitis. Other follow up may be recommended in the future.    [employer] is a genetic testing company that specializes in causes of pancreatitis. We discussed benefits and limitations of testing both through [facility] and another commercial laboratory today. This included information about testing coverage/genes tested, cost, and testing protocol. Of note, [facility] includes testing for additional genetic risk factors for pancreatitis not included elsewhere.    Clara elected to proceed with testing through [facility], pending coverage. Orders will be placed through their online secure portal, and@ will then log in and enter insurance information and consent for testing. Once@ initiates this testing process, insurance information will be obtained by the lab and shared with@, and@ will be sent a saliva collection kit. Information about [facility] coverage process was discussed briefly today and will be discussed in detail with@ by [facility]. Once@ mails this sample kit back to  [facility], testing will be initiated. Results from there take 4-6 weeks, at which time I would call@. Follow up for Clara and her family would depend on results.    I encouraged@ to contact me if she wishes to change testing labs/plans based on coverage information shared by the lab.    Result Outcomes  Once results are available, we discussed the following possibilities:  1. One disease causing mutation found: Individuals with typical hereditary pancreatitis typically have one disease causing change detected.  Depending on the change found and other factors, individuals are at risk for development of pancreatitis, and other family members would be at risk.  2. One or two possibly contributory mutation found:  This result has various implications depending the specific change found.  The change(s) may contribute to disease.   3. Two mutations in the gene for CF:  Individuals with cystic fibrosis or cystic fibrosis-related disorder usually have two mutations in this gene, one inherited from their mother and one from their father. Depending on the changes in the gene and other factors, some people may have respiratory, GI symptoms, and/or infertility issues.   4. No mutations in these genes: No genetic evidence to support hereditary pancreatitis at this time. It is possible we will learn more about the genetics of pancreatitis in the future and be able to offer more comprehensive genetic testing.   5. The finding of an unknown change: This happens when the laboratory detects a change but they do not know if it is found only in individuals with the condition, or if the change is found in individuals without pancreatitis as well. If you have this type of result, we will discuss it further at that time.    Plan  1. Consent was obtained and orders were placed for PancreasDx testing through [employer] [facility]. Testing process will be initiated by the patient through the [employer] Portal. From there, [facility] will send@ a  saliva sample, which@ will mail back directly to the lab.  2. Results will be returned to us 4-6 weeks after testing starts and I will notify@ of the results as soon as we receive them. This test can detect many changes in the known genes associated with hereditary pancreatitis; however, there remain genetic contributions that are not well understood.   3. Additional medical management recommendations or family member recommendations will be discussed in the future based on results        It was a pleasure to meet with@ ***virtually today.@ had no additional questions. My contact information was given for future questions or concerns that arise.     Emily Ortiz MS, [facility]  Genetic Counseling  Genetics and Metabolism Division  [facility]   Phone: [phone]  Pager: [phone]        CC: ***  Time Spent: ***        Nayeli Ortiz

## 2021-12-03 NOTE — PROGRESS NOTES
"Genetic Counseling Consultation  This note is a summary of Clara \"Livier\" Kyler s virtual telephone visit to [facility] on December 3, 2021. She was referred to our clinic by Dr. Abel Randall for genetic testing due to a history of chronic abdominal pain and at least 2 episodes consistent with pancreatitis. Genetic testing and a genetic counseling consultation was recommended to aid in better understanding the cause of her symptoms, as well as provide additional information helpful in diagnosis, medical management, and genetic risks for family members.     Summary of visit:  1. Genetics of pancreatitis were briefly discussed, including known involved genes, inheritance patterns, and impact on family members.    2. Genetic testing options were discussed, and the Chronic Pancreatitis Panel was ordered through [facility]. They will complete an insurance benefits investigation and send Clara a cheek swab/buccal sample kit. Once her sample is returned, results will take 2-3 weeks. I will call Clara at that time.  3. Contact information was given for future questions or concerns that arise.    Personal Medical History:  Clara is a 31 year old female with a history of chronic abdominal pain and multiple ED visits, with at least 2 episodes consistent with acute pancreatitis. This started in 2020. Specifically, she initially presented in Feb 2020 with chest pain and elevated lipases. In August she presented again with chest pain and elevated amylase. In November she notes presenting with stomach and back pain, with significantly elevated lipases. She has had chronic pain since then. Dr. Randall's note states: \"This is possibly consistent w chronic pancreatic pain, which can occur after documented bouts of acute pancreatitis.\" See GI notes for known imaging/procedure details (including EUS, ERCP, and MRCP with secretin). Clara had a cholecystectomy in Jan 2021, which Clara notes showed " inflammation.    Clara has history of occasional smoking previously, but has quit. Excessive use of alcohol or other triggering factors is denied and work-up for pancreatitis appears to be otherwise negative for known causative factors.     Clara is otherwise healthy.    Family History:  A three generation pedigree was obtained today and sent to be scanned into the EMR. The family history was significant for the following:    Clara has one son, who is 5 years old and healthy.     Clara has one sister and one brother. Her sister has had her gallbladder removed, but her siblings and their children are otherwise healthy.    Clara's mother has no major health concerns. She has two brothers and one sister. One brother passed from liver problems. Full details were unknown, but Clara notes he has a history of EtOH usage. Clara's mother's sister has had her gallbladder removed, but is otherwise healthy. Clara's maternal first cousins have no major health concerns.    Clara's maternal grandmother has had her gallbladder removed, but is otherwise healthy. Clara's maternal grandfather passed at 62 years from an aneurysm.     Clara's father has a history of back surgery, but is otherwise healthy. He has three sisters and two brothers. One brother has a history of seizures (controlled with medication); his siblings are otherwise healthy. One of Clara's paternal cousins may have a history of infertility (full details unknown); her paternal first cousins are otherwise healthy.     Clara's paternal grandmother passed at an older age (>80 years). She had a history of diabetes. Clara's paternal grandfather passed at an older age (>80) from an infection originating in his foot.    The family history was negative for known individuals with diagnosed pancreatitis, pancreatic cancer, known genetic conditions, young passing, and cystic fibrosis/CF symptoms. Clara is of [place] ancestry on her maternal  side and [place] ancestry on her paternal side. Consanguinity was denied.    General Genetic Background Information  Genes are the instruction code for our body, and tell our body how to grow and function. Our genes are in every cell of our body, and are packaged in our chromosomes.  Genes and chromosomes come in pairs. We inherit one copy out of each pair from our mother and one copy of each pair from our father. No one has control over which copy they pass on to their child since it is a random process.   Every person has two copies of each gene.     Genetics of Pancreatitis  There are many known factors for pancreatitis, including genetic factors. Knowns and unknowns about these causes of pancreatitis, focusing on hereditary pancreatitis, were discussed today. Hereditary pancreatitis can be defined based on family history criteria or on genetic findings in an individual. One of the genes that is causative and associated with high risk for pancreatitis is PRSS1. This gene can also cause an increased risk for pancreatic cancer.  Many other genes, such as SPINK1, CTRC, CPA1, CaSR, and others are thought to be moderate risk genes and have an association with pancreatitis. Additionally idiopathic pancreatitis has been found to be associated with changes in the gene for cystic fibrosis (CFTR) as well. The variability in the inheritance of genes associated with hereditary pancreatitis was discussed.      Typical hereditary pancreatitis is a disease that is primarily inherited in an autosomal dominant manner, meaning that a change in one copy of a gene, like PRSS1, is needed to develop the condition. One other gene known to be associated with hereditary pancreatitis, CFTR, typically causes disease when inherited in an autosomal recessive manner, meaning that an individual must inherit a change in the CFTR gene from both their mother and father. The risk factor genes are also inherited in an autosomal dominant fashion, but  with these genes most individuals that carry a change do not get pancreatitis. We discussed the complexity of genetic testing for hereditary pancreatitis and that further discussion would be needed when we have the results.    If a known disease causing change was found, this would carry up to a 50% potential risk for future children and siblings to inherit the genetic change, and a 50% chance of not inheriting it. Not all individuals with genetic changes in these genes go on to develop pancreatitis, and each gene is associated with a different risk for the development of pancreatitis. If a change is found in the CFTR gene, this result would have additional implications (such as chance for cystic fibrosis in an individual and/or their children) and they will be discussed further if applicable. Brief information about cystic fibrosis, its associated symptoms (lung infections and complications, pancreatitis, sinus disease, and/or male infertility), and its variability were discussed today. If a specific genetic variant in a family can be identified, more information about risk for other family members and familial testing would be available.    Genetic Testing  There are genetic buccal or blood tests available that can help determine if an individual has changes in genes associated with pancreatitis (including PRSS1, SPINK1, CTRC, and CFTR genes). As the cause of Clara's history is unclear and a genetic factor possible, comprehensive genetic testing will give useful information to aid in directing diagnosis, medical management, reproductive risks, and family member risks. Specifically, if a underlying explanation for Giannis history can be found, it may give more information about correct diagnosis; Dr. Randall's note states pancreatitis genetic testing may help explain her history. In addition, this could give information about how to appropriately manage her and what to expect. Secondly, it is possible an  underlying genetic cause may predispose Clara to other health risks. Knowing about these additional health risks can help us stay ahead of her healthcare to more appropriately screen for and potentially prevent other complications. Lastly, discovering an underlying reason may help predict the chance for other family members to have pancreatitis and/or other genetic conditions, such as cystic fibrosis. Given Clara's young age of onset for her symptoms and the above information, genetic testing assessing hereditary pancreatitis factors is beneficial and medically necessary for Clara.    Limitations and risks of genetic testing were also discussed, including that our genetic testing in 2021 is only as good as our current knowledge of genetics and genes. Therefore, a negative test result does not rule out all genetic changes, and does NOT rule out pancreatitis for an individual. Other genetics' follow up may be recommended in the future.    We discussed benefits and limitations of testing both through [facility] vs [facility] today. This included information about testing coverage/genes tested, cost, and testing protocol. Of note, [facility] includes testing for additional genetic minor risk factors for pancreatitis. [facility] panel includes the CASR, CFTR, CPA1, CTRC, PRSS1, and SPINK1 genes.    Clara elected to proceed with testing through [facility], pending coverage. Consent was received verbally. She elected to proceed with testing via a buccal/cheek swab sample, which will be mailed to her. Once the sample is received back, [facility] will conduct a benefits investigation. Clara will be contacted by [facility] directly with benefits investigation information and billing options (insurance vs self pay) if estimated OOP is >$100. Results from there will take approximately 2-3 weeks, at which time I will call her. Follow up for Clara and her family would depend on results.    Result Outcomes  Once  results are available, we discussed the following possibilities:  1. One disease causing mutation found: Individuals with typical hereditary pancreatitis typically have one disease causing change detected.  Depending on the change found and other factors, individuals are at risk for development of pancreatitis, and other family members would be at risk.  2. One or two possibly contributory mutation found:  This result has various implications depending the specific change found.  The change(s) may contribute to disease.   3. Two mutations in the gene for CF:  Individuals with cystic fibrosis or cystic fibrosis-related disorder usually have two mutations in this gene, one inherited from their mother and one from their father. Depending on the changes in the gene and other factors, some people may have respiratory, GI symptoms, and/or infertility issues.   4. No mutations in these genes: No genetic evidence to support hereditary pancreatitis at this time. It is possible we will learn more about the genetics of pancreatitis in the future and be able to offer more comprehensive genetic testing.   5. The finding of an unknown change: This happens when the laboratory detects a change but they do not know if it is found only in individuals with the condition, or if the change is found in individuals without pancreatitis as well. If you have this type of result, we will discuss it further at that time.    Plan  1. Consent was obtained and orders were placed for [facility] Chronic Pancreatitis Panel (CASR, CFTR, CPA1, CTRC, PRSS1, SPINK1 genes). They will complete a benefits investigation and mail Clara a buccal sample kit.  2. Results will be returned to us 2-3 weeks after Clara's sample is received back by the lab, and I will notify Clara of the results as soon as we receive them. This test can detect many changes in the known genes associated with hereditary pancreatitis; however, there remain genetic  contributions that are not well understood.   3. Additional medical management recommendations or family member recommendations will be discussed in the future based on results.      It was a pleasure to meet with Clarashantal morales today. She had no additional questions. My contact information was given for future questions or concerns that arise.     Emily Ortiz MS, [facility]  Genetic Counseling  Genetics and Metabolism Division  [facility]   Phone: [phone]  Pager: [phone]        CC: Dr. Randall; PCP  Time Spent: 45 min

## 2021-12-03 NOTE — LETTER
"12/3/2021      RE: Clara Chávez  [address] [facility] [address]       Clara is a 31 year old who is being evaluated via a billable telephone visit.      What phone number would you like to be contacted at? [phone]  How would you like to obtain your AVS? Mail a copy  Phone call duration: 60 minutes  ELOY Tate    Genetic Counseling Consultation  This note is a summary of Clara \"Livier\" Kyler s virtual telephone visit to [facility] on December 3, 2021. She was referred to our clinic by Dr. Abel Randall for genetic testing due to a history of chronic abdominal pain and at least 2 episodes consistent with pancreatitis. Genetic testing and a genetic counseling consultation was recommended to aid in better understanding the cause of her symptoms, as well as provide additional information helpful in diagnosis, medical management, and genetic risks for family members.     Summary of visit:  1. Genetics of pancreatitis were briefly discussed, including known involved genes, inheritance patterns, and impact on family members.    2. Genetic testing options were discussed, and the Chronic Pancreatitis Panel was ordered through [email or web address]. They will complete an insurance benefits investigation and send Clara a cheek swab/buccal sample kit. Once her sample is returned, results will take 2-3 weeks. I will call Clara at that time.  3. Contact information was given for future questions or concerns that arise.    Personal Medical History:  Clara is a 31 year old female with a history of chronic abdominal pain and multiple ED visits, with at least 2 episodes consistent with acute pancreatitis. This started in 2020. Specifically, she initially presented in Feb 2020 with chest pain and elevated lipases. In August she presented again with chest pain and elevated amylase. In November she notes presenting with stomach and back pain, with significantly elevated lipases. She has " "had chronic pain since then. Dr. Randall's note states: \"This is possibly consistent w chronic pancreatic pain, which can occur after documented bouts of acute pancreatitis.\" See GI notes for known imaging/procedure details (including EUS, ERCP, and MRCP with secretin). Clara had a cholecystectomy in Jan 2021, which Clara notes showed inflammation.    Clara has history of occasional smoking previously, but has quit. Excessive use of alcohol or other triggering factors is denied and work-up for pancreatitis appears to be otherwise negative for known causative factors.     Clara is otherwise healthy.    Family History:  A three generation pedigree was obtained today and sent to be scanned into the EMR. The family history was significant for the following:    Clara has one son, who is 5 years old and healthy.     Clara has one sister and one brother. Her sister has had her gallbladder removed, but her siblings and their children are otherwise healthy.    Giannis mother has no major health concerns. She has two brothers and one sister. One brother passed from liver problems. Full details were unknown, but Clara notes he has a history of EtOH usage. Clara's mother's sister has had her gallbladder removed, but is otherwise healthy. Clara's maternal first cousins have no major health concerns.    Clara's maternal grandmother has had her gallbladder removed, but is otherwise healthy. Clara's maternal grandfather passed at 62 years from an aneurysm.     Clara's father has a history of back surgery, but is otherwise healthy. He has three sisters and two brothers. One brother has a history of seizures (controlled with medication); his siblings are otherwise healthy. One of Giannis paternal cousins may have a history of infertility (full details unknown); her paternal first cousins are otherwise healthy.     Clara's paternal grandmother passed at an older age (>80 years). She had a history of " diabetes. Clara's paternal grandfather passed at an older age (>80) from an infection originating in his foot.    The family history was negative for known individuals with diagnosed pancreatitis, pancreatic cancer, known genetic conditions, young passing, and cystic fibrosis/CF symptoms. Clara is of [place] ancestry on her maternal side and [place] ancestry on her paternal side. Consanguinity was denied.    General Genetic Background Information  Genes are the instruction code for our body, and tell our body how to grow and function. Our genes are in every cell of our body, and are packaged in our chromosomes.  Genes and chromosomes come in pairs. We inherit one copy out of each pair from our mother and one copy of each pair from our father. No one has control over which copy they pass on to their child since it is a random process.   Every person has two copies of each gene.     Genetics of Pancreatitis  There are many known factors for pancreatitis, including genetic factors. Knowns and unknowns about these causes of pancreatitis, focusing on hereditary pancreatitis, were discussed today. Hereditary pancreatitis can be defined based on family history criteria or on genetic findings in an individual. One of the genes that is causative and associated with high risk for pancreatitis is PRSS1. This gene can also cause an increased risk for pancreatic cancer.  Many other genes, such as SPINK1, CTRC, CPA1, CaSR, and others are thought to be moderate risk genes and have an association with pancreatitis. Additionally idiopathic pancreatitis has been found to be associated with changes in the gene for cystic fibrosis (CFTR) as well. The variability in the inheritance of genes associated with hereditary pancreatitis was discussed.      Typical hereditary pancreatitis is a disease that is primarily inherited in an autosomal dominant manner, meaning that a change in one copy of a gene, like PRSS1, is needed to  develop the condition. One other gene known to be associated with hereditary pancreatitis, CFTR, typically causes disease when inherited in an autosomal recessive manner, meaning that an individual must inherit a change in the CFTR gene from both their mother and father. The risk factor genes are also inherited in an autosomal dominant fashion, but with these genes most individuals that carry a change do not get pancreatitis. We discussed the complexity of genetic testing for hereditary pancreatitis and that further discussion would be needed when we have the results.    If a known disease causing change was found, this would carry up to a 50% potential risk for future children and siblings to inherit the genetic change, and a 50% chance of not inheriting it. Not all individuals with genetic changes in these genes go on to develop pancreatitis, and each gene is associated with a different risk for the development of pancreatitis. If a change is found in the CFTR gene, this result would have additional implications (such as chance for cystic fibrosis in an individual and/or their children) and they will be discussed further if applicable. Brief information about cystic fibrosis, its associated symptoms (lung infections and complications, pancreatitis, sinus disease, and/or male infertility), and its variability were discussed today. If a specific genetic variant in a family can be identified, more information about risk for other family members and familial testing would be available.    Genetic Testing  There are genetic buccal or blood tests available that can help determine if an individual has changes in genes associated with pancreatitis (including PRSS1, SPINK1, CTRC, and CFTR genes). As the cause of Clara's history is unclear and a genetic factor possible, comprehensive genetic testing will give useful information to aid in directing diagnosis, medical management, reproductive risks, and family member risks.  Specifically, if a underlying explanation for Clara's history can be found, it may give more information about correct diagnosis; Dr. Randall's note states pancreatitis genetic testing may help explain her history. In addition, this could give information about how to appropriately manage her and what to expect. Secondly, it is possible an underlying genetic cause may predispose Clara to other health risks. Knowing about these additional health risks can help us stay ahead of her healthcare to more appropriately screen for and potentially prevent other complications. Lastly, discovering an underlying reason may help predict the chance for other family members to have pancreatitis and/or other genetic conditions, such as cystic fibrosis. Given Clara's young age of onset for her symptoms and the above information, genetic testing assessing hereditary pancreatitis factors is beneficial and medically necessary for Clara.    Limitations and risks of genetic testing were also discussed, including that our genetic testing in 2021 is only as good as our current knowledge of genetics and genes. Therefore, a negative test result does not rule out all genetic changes, and does NOT rule out pancreatitis for an individual. Other genetics' follow up may be recommended in the future.    We discussed benefits and limitations of testing both through [facility] vs [facility] today. This included information about testing coverage/genes tested, cost, and testing protocol. Of note, [facility] includes testing for additional genetic minor risk factors for pancreatitis. [facility] panel includes the CASR, CFTR, CPA1, CTRC, PRSS1, and SPINK1 genes.    Clara elected to proceed with testing through [facility], pending coverage. Consent was received verbally. She elected to proceed with testing via a buccal/cheek swab sample, which will be mailed to her. Once the sample is received back, [facility] will conduct a benefits  investigation. Clara will be contacted by [facility] directly with benefits investigation information and billing options (insurance vs self pay) if estimated OOP is >$100. Results from there will take approximately 2-3 weeks, at which time I will call her. Follow up for Clara and her family would depend on results.    Result Outcomes  Once results are available, we discussed the following possibilities:  1. One disease causing mutation found: Individuals with typical hereditary pancreatitis typically have one disease causing change detected.  Depending on the change found and other factors, individuals are at risk for development of pancreatitis, and other family members would be at risk.  2. One or two possibly contributory mutation found:  This result has various implications depending the specific change found.  The change(s) may contribute to disease.   3. Two mutations in the gene for CF:  Individuals with cystic fibrosis or cystic fibrosis-related disorder usually have two mutations in this gene, one inherited from their mother and one from their father. Depending on the changes in the gene and other factors, some people may have respiratory, GI symptoms, and/or infertility issues.   4. No mutations in these genes: No genetic evidence to support hereditary pancreatitis at this time. It is possible we will learn more about the genetics of pancreatitis in the future and be able to offer more comprehensive genetic testing.   5. The finding of an unknown change: This happens when the laboratory detects a change but they do not know if it is found only in individuals with the condition, or if the change is found in individuals without pancreatitis as well. If you have this type of result, we will discuss it further at that time.    Plan  1. Consent was obtained and orders were placed for [facility] Chronic Pancreatitis Panel (CASR, CFTR, CPA1, CTRC, PRSS1, SPINK1 genes). They will complete a benefits  investigation and mail Clara a buccal sample kit.  2. Results will be returned to us 2-3 weeks after Clara's sample is received back by the lab, and I will notify Clara of the results as soon as we receive them. This test can detect many changes in the known genes associated with hereditary pancreatitis; however, there remain genetic contributions that are not well understood.   3. Additional medical management recommendations or family member recommendations will be discussed in the future based on results.      It was a pleasure to meet with Clara virtually today. She had no additional questions. My contact information was given for future questions or concerns that arise.     Emily Ortiz MS, [facility]  Genetic Counseling  Genetics and Metabolism Division  [facility]   Phone: [phone]  Pager: [phone]        CC: Dr. Randall; PCP  Time Spent: 45 min

## 2021-12-27 ENCOUNTER — TELEPHONE (OUTPATIENT)
Dept: CONSULT | Facility: CLINIC | Age: 31
End: 2021-12-27
Payer: COMMERCIAL

## 2021-12-27 NOTE — TELEPHONE ENCOUNTER
I called Clara to discuss her pancreatitis genetic test results, which have returned. Left my number for her to call me back at her convenience to discuss.      Emily Ortiz MS, [facility]  Genetic Counseling  Genetics and Metabolism Division  [facility]   Phone: [phone]  Pager: [phone]  Email: [email or web address]

## 2021-12-27 NOTE — TELEPHONE ENCOUNTER
Clara returned my call about the below. We briefly reviewed that her testing was sent to [facility], who tested for genetic changes, or mutations, in 6 high/moderate risk pancreatitis genes: PRSS1, SPINK1, CASR, CFTR, CPA1, and CTRC.     This testing returned negative/normal, meaning no pathogenic changes were identified in these genes for Clara. No variants of uncertain significance were identified either. Therefore, no underlying single genetic explanation for Clara's symptoms was found at this time.      We discussed that these results do NOT rule out pancreatitis for an individual. It is possible one's pancreatitis is instead due to non-genetic causes or a combination of multiple smaller genetic + non-genetic factors together. In addition, our current genetic testing is only as good as our current understanding of genes and genetics, and all genetic testing has limitations. Therefore, these results do not rule out pancreatitis (and do not rule out all genetic factors), though they do greatly reduce the chance Clara has a large single genetic factor/explanation for pancreatitis.    This information will be shared with Dr. Randall for Clara's on-going GI management.    I encouraged Clara to keep me updated with any major personal or family history changes. It is possible that additional pancreatitis genes or information will be discovered in the future. Follow up would certainly be recommended for any family members with any pancreatitis symptoms.      Clara had no other questions at this time. A copy of this test result and summary of our conversation will be sent to her home in the coming days. Clara was appreciative of the call and had my contact information for any questions or concerns that arise.        Emily Ortiz MS, [facility]  Genetic Counseling  Genetics and Metabolism Division  [facility]   Phone: [phone]  Pager: [phone]  Email:  [email or web address]

## 2021-12-29 ENCOUNTER — PATIENT OUTREACH (OUTPATIENT)
Dept: GASTROENTEROLOGY | Facility: CLINIC | Age: 31
End: 2021-12-29
Payer: COMMERCIAL

## 2022-01-06 ENCOUNTER — HOSPITAL ENCOUNTER (OUTPATIENT)
Facility: AMBULATORY SURGERY CENTER | Age: 32
End: 2022-01-06
Attending: ANESTHESIOLOGY
Payer: COMMERCIAL

## 2022-01-06 ENCOUNTER — OFFICE VISIT (OUTPATIENT)
Dept: ANESTHESIOLOGY | Facility: CLINIC | Age: 32
End: 2022-01-06
Payer: COMMERCIAL

## 2022-01-06 ENCOUNTER — TELEPHONE (OUTPATIENT)
Dept: ANESTHESIOLOGY | Facility: CLINIC | Age: 32
End: 2022-01-06

## 2022-01-06 VITALS — SYSTOLIC BLOOD PRESSURE: 124 MMHG | DIASTOLIC BLOOD PRESSURE: 83 MMHG | OXYGEN SATURATION: 98 % | HEART RATE: 75 BPM

## 2022-01-06 DIAGNOSIS — M54.14 THORACIC RADICULOPATHY: ICD-10-CM

## 2022-01-06 DIAGNOSIS — M54.14 THORACIC RADICULOPATHY: Primary | ICD-10-CM

## 2022-01-06 DIAGNOSIS — R10.10 CHRONIC UPPER ABDOMINAL PAIN: ICD-10-CM

## 2022-01-06 DIAGNOSIS — G89.29 CHRONIC UPPER ABDOMINAL PAIN: ICD-10-CM

## 2022-01-06 PROCEDURE — 99204 OFFICE O/P NEW MOD 45 MIN: CPT | Performed by: ANESTHESIOLOGY

## 2022-01-06 RX ORDER — DULOXETIN HYDROCHLORIDE 30 MG/1
60 CAPSULE, DELAYED RELEASE ORAL DAILY
Qty: 60 CAPSULE | Refills: 0 | Status: SHIPPED | OUTPATIENT
Start: 2022-01-06 | End: 2022-02-05

## 2022-01-06 ASSESSMENT — PAIN SCALES - GENERAL: PAINLEVEL: EXTREME PAIN (8)

## 2022-01-06 NOTE — PATIENT INSTRUCTIONS
Medications:    DULoxetine (CYMBALTA) 30 MG capsule- 1 cap daily for 7 days, then increase to 60 mg daily.    Please provide the clinic with a minium of 1 week notice, on all prescription refills.     Procedures:    Call to schedule your procedure: [phone]    Thoracic Epidural Steroid Injection    Your pre-procedure instructions are below, please call our clinic if you have any questions.      Recommended Follow up:      3-4 weeks after the procedure        Procedure Information related to COVID-19    Please call [phone] to schedule, reschedule, or cancel your procedure appointment.   Phones are answered Monday - Friday from 08:00 - 4:30pm.  Leave a voicemail with your name, birth date, and phone number if no one is available to take your call.     You will need to be tested for COVID-19 within 4 days (96 hours) of your procedure.  You will be called to schedule your COVID test by a central scheduling team. If you have not been contacted to schedule your test within 4 days of the scheduled procedure, call [phone]    Please be aware that the turn around time for the test is approximately 24-72 hours.   If your results are still pending the day of your procedure, you will be notified as soon as possible as the procedure may be cancelled.    Please note: You will only be contacted for positive and pending results.     The procedure center staff will call you several days before the procedure to review important information that you will need to know for the day of the procedure.   Please contact the clinic if you have further questions about this information.       Information related to Scheduling and Pre-Procedure Instructions:      If you are having a Diagnostic procedure, you will be given a Pain Diary to document your pain relief.   Please fax the completed form to our office.   fax number is [phone]    If you must reschedule your procedure more than two times, you must follow up in clinic  before rescheduling again.      Preparing for your procedure    CAUTION - FAILURE TO FOLLOW THESE PRE-PROCEDURE INSTRUCTIONS WILL RESULT IN YOUR PROCEDURE BEING RESCHEDULED.      Your procedure: T      Pregnancy  If you are pregnant, or think you may be pregnant, please notify our staff. This may or may not affect the ability to perform the procedure.       You must have a  take you home after your procedure. Transportation by taxi or para-transit is okay as long as you have a responsible adult accompany you. You must provide your 's full name and contact number at time of check in.     Fasting Protocol Please have nothing to eat and drink for 2 hours before the procedure.      Medications If you take any medications, DO NOT STOP. Take your medications as usual the day of your procedure with a sip of water AT LEAST 2 HOURS PRIOR TO ARRIVAL.    Antibiotics If you are currently taking antibiotics, you must complete the entire dose 7 days prior to your scheduled procedure. You must be clear of any signs or symptoms of infection. If you begin antibiotics, please contact our clinic for instructions.     Fever, Chills, or Rash If you experience a fever of higher than 100 degrees, chills, rash, or open wounds during the one week before your procedure, please call the clinic to see if you may proceed with your procedure.      Medication Hold List  **Patients under Cardiology/Neurology care should consult their provider prior to the pain procedure to verify pre-procedure medication instructions. The information below contains general guidelines.**    Blood Thinners If you are taking daily ASPIRIN, PLAVIX, OR OTHER BLOOD THINNERS SUCH AS COUMADIN/WARFARIN, we will need your prescribing doctor to sign a release permitting you to stop these medications. Once approved by your prescribing doctor - STOP ALL BLOOD THINNERS BASED ON THE TIME TABLE BELOW PRIOR TO YOUR PROCEDURE. If you have been instructed to stop  WARFARIN(COUMADIN), you must have an INR lab drawn the day before your procedure. . Your INR must be within normal limits before we can perform your injection. MEDICATIONS CAN BE RESTARTED AFTER YOUR PROCEDURE.    14 DAY HOLD  Ticlid (ticlopidine)    10 DAY HOLD  Effient (Prasugel)    3 DAY HOLD  Xarelto (rivaroxaban) 7 DAY HOLD  Anacin, Bufferin, Ecotrin, Excedrin, Aggrenox (Aspirin)  Brilinta (ticagrelor)  Coumadin (Warfarin)  Pradexa (Dabigatran)  Elmiron (Pentosan)  Plavix (Clopidogrel Bisulfate)  Pletal (Cilostazol)    24 HOUR HOLD  Lovenox (enoxaparin)  Agrylin (Anagrelide)        Non-steroidal Anti-inflammatories (NSAIDs) DO NOT TAKE any non-steroidal anti-inflammatory medications (NSAIDs) listed on the table below. MEDICATIONS CAN BE RESTARTED AFTER YOUR PROCEDURE. Celebrex is OK to take and does not need to be discontinued.     Medications to stop:  3 DAY HOLD  Advil, Motrin (Ibuprofen)  Arthrotec (diciofenac sodium/misoprostol)  Clinoril (Sulindac)  Indocin (Indomethacin)  Lodine (Etodolac)  Toradol (Ketorolac)  Vicoprofen (Hydrocodone and Ibuprofen)  Voltaren (Diclotenac)    14 DAY HOLD  Daypro (Oxaprozin)  Feldene (Piroxicam)   7 DAY HOLD  Aleve (Naproxen sodium)  Darvon compound (contains aspirin)  Naprosyn (Naproxen)  Norgesic Forte (contains aspirin)  Mobic (Meloxicam)  Oruvall (Ketoprofen)  Percodan (contains aspirin)  Relafen (Nabumetone)  Salsalate  Trilisate  Vitamin E (more than 400 mg per day)  Any medication containing aspirin                To speak with a nurse, schedule/reschedule/cancel a clinic appointment, or request a medication refill call: [phone]     You can also reach us by [employer]: [email or web address]        Having a Thoracic Epidural Injection  A thoracic epidural injection is a shot that helps ease pain in your upper to middle back (thoracic) area. Medicine is injected into the area around your spinal cord.   What to tell your healthcare provider  Tell your  healthcare provider about all the medicines you take. This includes over-the-counter medicines such as ibuprofen. It also includes vitamins, herbs, and other supplements. And tell your healthcare provider if you:     Have had any recent changes in your health, such as an infection or fever    Are sensitive or allergic to contrast dye    Are sensitive or allergic to any medicines, latex, tape, or anesthesia (local and general)    Are pregnant or think you could be   Tests before your procedure  You may need other tests before you get the injection. For example, you may have an MRI scan of your upper to middle back area.   Getting ready for your procedure             Talk with your healthcare provider how to get ready for your procedure. You may need to stop taking some medicines before the procedure, such as blood thinners and aspirin.   Also, make sure to do the following:    Ask a family member or friend to take you home from the hospital.    Don't eat or drink for several hours before your procedure.    Follow all other instructions from your healthcare provider.  You will be asked to sign a consent form that gives your permission to do the procedure. Read the form carefully. Ask questions if something is not clear.   On the day of your procedure  You may have the procedure at the hospital, a surgery center, or a clinic. A pain specialist, radiologist, or other type of specialist may do the procedure. Your healthcare provider can tell you exactly what to expect. In general:     You may be given medicine to make you feel relaxed and sleepy (sedation).    You will lie on your belly (abdomen) or on your side on a special table.    The healthcare provider will clean the skin on your back in the area of the injection.    He or she will inject some medicine to numb the area (local anesthetic).    The provider will use special X-ray (fluoroscopy or CT scan) to put the needle into the correct area.    He or she will  inject contrast dye to make sure the needle is in the epidural space.    He or she will slowly inject medicine. It's usually a mix of anti-inflammatory medicine (steroid) and numbing medicine (anesthetic). You may feel slight discomfort and pressure.    Once done, the provider will remove the needle. He or she may cover the injection site with a small bandage.    The steroid medicine lessens swelling and nerve irritation. The anesthetic medicine numbs the area.  After your procedure  After the procedure, you will be moved to a chair or bed to rest for a while. The healthcare staff will watch you for any problems. You should be able to go home in an hour or so. Make sure you follow all instructions from your healthcare provider and the staff. You may be told to do the following:     Rest for the rest of the day, and go back to your normal activities the next day.    Don't drive or make any important decisions for at least 24 hours, if you had sedation.    Expect some numbness in your arms that will go away within a few hours.  Recovering at home  You may not notice any improvement right after the injection. You may even feel a little worse afterward. It may take a few days to a week until your pain lessens. The pain relief may last for weeks or months. Or the pain may not return at all. Sometimes no pain relief occurs.   Follow-up care  You ll have an appointment with your healthcare provider in 1 to 2 weeks to discuss:    If the injection has lessened your pain    If you need additional injections    Other treatment you may need, such as exercises, medicines, and physical therapy  When to call your healthcare provider  Call your healthcare provider right away if you have any of these:    Fever of 100.4 F (38.0 C) or higher, or as advised by your provider    Numbness at the injection site that doesn t go away    Warmth and redness at the injection site, or in your arms, that doesn't go away    Pain that is getting  tremayne Aden last reviewed this educational content on 10/1/2019    6229-0906 [employer]. All rights reserved. This information is not intended as a substitute for professional medical care. Always follow your healthcare professional's instructions.     Understanding Thoracic Epidural Injection  A thoracic epidural injection is a shot that helps ease pain in your upper to middle back (thoracic) area. Medicine is injected into the area around your spinal cord.   About the spinal cord  Your spinal cord is a bundle of nerves that runs from the base of your brain to your lower back. The spinal nerves run off either side of it. Your spinal cord is surrounded and protected by your backbone (spine). It's made up of many small bones stacked on top of each other (vertebrae). Cushions (intervertebral disks) are between the vertebrae. The space between the covering of the spinal cord (dura) and the spine is the epidural space.   Why a thoracic epidural injection is done  A spinal nerve can become pinched or inflamed. That may happen if a disk presses on a nerve. This can happen anywhere along your spinal cord and spine. It may cause pain in the area. The pain may also extend to other areas of your body.   A thoracic epidural injection may give pain relief for problems that affect the thoracic spine such as:     Part of a disk squeezing against a nerve (disk herniation)    Narrowing in the space for the nerve (spinal stenosis)    Long-term pain after back surgery (post-surgery spine syndrome)    An injury that irritates a spinal nerve  A thoracic epidural injection may be done if other treatments don t work to ease your pain. The injection may help ease pain in your middle to upper back for weeks to months or longer. You may have more than one injection, but not usually more than one in a month.   An epidural injection may also be used to help diagnose the source of your pain. Your healthcare provider will  inject numbing medicine (local anesthetic) into the epidural space. If your pain quickly goes away, your healthcare provider will know that a nerve is causing your pain. He or she can then treat your pain better.   How thoracic epidural injection is done   A pain specialist, radiologist, or other type of specialist may do the procedure. You will lie on your belly (abdomen) or on your side on a special table. Your healthcare provider will use special X-ray (fluoroscopy or CT scan) to put the needle into the correct area. He or she will inject contrast dye to make sure the needle is in the epidural space. He or she will slowly inject medicine. It's usually a mix of anti-inflammatory medicine (steroid) and numbing medicine (anesthetic). The steroid medicine lessens swelling and nerve irritation. The anesthetic medicine numbs the area. You may feel slight discomfort and pressure when the medicine is injected. This usually disappears once all of the medicine is injected.   Risks of thoracic epidural injection  Most procedures have some risks. The risks of this procedure include:    Needle puncture of the covering of the spinal cord (dura) that causes a spinal fluid leak and severe headache    Infection    Bleeding    Injury to a nerve    No pain relief    Muscle weakness    Paralysis    Reaction to the medicine such as rash or hot flashes  Your own risks may differ. They depend on your age, overall health, and other factors. You may not be able to have an injection if you have a health condition such as:     Infection    Bleeding disorder    Uncontrolled high blood pressure    Diabetes    Heart-related chest pain (unstable angina)    Heart failure    Allergy to contrast dye, local anesthetics, or steroids  Talk with your healthcare provider about your specific risks.  [employer] last reviewed this educational content on 5/1/2020 2000-2021 [employer]. All rights reserved. This information is not intended  as a substitute for professional medical care. Always follow your healthcare professional's instructions.

## 2022-01-06 NOTE — NURSING NOTE
LPN read through the instructions with pt for the recommended procedure: TESI  Pt verbalized understanding to holding appropriate medication per protocol, and was agreeable to NPO policy and needing a .    Anticoagulant: None, Pt denied    Recommended follow up: 3-4 weeks after the procedure.     Ellen Vaz LPN

## 2022-01-06 NOTE — PROGRESS NOTES
"  Pain Clinic New Patient Consult Note:    Referring Provider: Prakash   Primary care provider: No Ref-Primary, Physician.    Clara Chávez is a 31 year old y.o. old female who presents to the pain clinic with chronic abdominal pain.    HPI:  Patient Supplied Answers To the [facility] Pain Questionnaire  [facility] -  Patient Entered Questionnaire/Answers 1/6/2022   What number best describes your pain right now:  0 = No pain  to  10 = Worst pain imaginable 8   How would you describe the pain? burning, sharp, dull, aching   Which of the following worsen your pain? nothing worsens the pain   Which of the following improve or reduce your pain?  medication   What number best describes your average pain for the past week:  0 = No pain  to  10 = Worst pain imaginable 9   What number best describes your LOWEST pain in past 24 hours:  0 = No pain  to  10 = Worst pain imaginable 4   What number best describes your WORST pain in past 24 hours:  0 = No pain  to  10 = Worst pain imaginable 9   When is your pain worst? Night   Have you tried treating your pain with medication?  Yes   Are you currently taking medications for your pain? Yes     Clara is a 31 years old female who is tearful with puffy eyes as I enter this clinical examination room with her seated in the patient chair. She describes severe epigastric pain mostly left, sometimes to the right and middle back and right shoulder blade ( interscapular area). She had extensive work up for possible chronic pancreatitis. She reports she has had 2 attacks for pancreatitis. She is experiencing severe chronic pain since November of 2020 mostly \"burning\" in nature. Sometimes the pain is  just in the back lower thoracic spine area. Back pain is also burning type of pain. In the recent past, this pain was constant and interrupted her pain. Constant in the right shoulder ( medial interscapular area)  Worse- not sure.   Later she reports that putting her 4 yo son in the car seat " causes her to have severe epigastric burning type of pain.   Better- tramadol. Takes the edge off if it.     Pain treatments:    Herbal medicines: marshmallow root, turmeric  Physical therapy: exercises at , [place]  Chiropractor: yes, did not help, April 2021  Pain physician: none  Surgery: cholecystectomy, egd, colonoscopy  Biofeedback: none  Acupuncture: none    Tests/Imaging reviewed with the patient:    Thoracic spine 1/29/2021  T5-6 Shallow central disc protrusion mildly indents the cord and minimally narrows the spinal canal.     T8-9 Shallow central disc protrusion minimally narrows the spinal canal.   T10-11 Shallow left central disc protrusion minimally narrows the spinal canal.   T11-12 Shallow posterior disc bulge minimally narros spinal canal.   Comments: Mild to moderate facet arthropathy and small disc protrusions.   Schmorl's nodes at T5-6 through T12-L1.     Significant Medical History:   No past medical history on file.       Past Surgical History:  No past surgical history on file.       Family History:  No family history on file.       Social History:  Social History     Socioeconomic History     Marital status: Single     Spouse name: Not on file     Number of children: Not on file     Years of education: Not on file     Highest education level: Not on file   Occupational History     Not on file   Tobacco Use     Smoking status: Never Smoker     Smokeless tobacco: Never Used   Substance and Sexual Activity     Alcohol use: Not on file     Drug use: Not on file     Sexual activity: Not on file   Other Topics Concern     Not on file   Social History Narrative     Not on file     Social Determinants of Health     Financial Resource Strain: Not on file   Food Insecurity: Not on file   Transportation Needs: Not on file   Physical Activity: Not on file   Stress: Not on file   Social Connections: Not on file   Intimate Partner Violence: Not on file   Housing Stability: Not on file     Social History      Social History Narrative     Not on file          Allergies:  No Known Allergies    Current Medications:   Current Outpatient Medications   Medication Sig Dispense Refill     sucralfate (CARAFATE) 1 GM tablet Take 1 tablet (1 g) by mouth 4 times daily (before meals and nightly) 360 tablet 3     traMADol (ULTRAM) 50 MG tablet Take 50 mg by mouth every 6 hours as needed for severe pain            Current Pain Medications:  Medications related to Pain Management (From now, onward)    None           Past Pain Medications:    amitritylline 25 mg only, did not escalate dose, did not help at that dose. No side effect.   Gabapentin in the past- more than 1 year, unclear about dose.     Blood thinner:    none    Work History:    Current work status: staff a nursing    Psychosocial History:     History of treatment for behavioral disorder: none  History of suicidal ideation or suicidal attempt: none    Review of Systems:  Review of Systems   All other systems reviewed and are negative.    Answers for HPI/ROS submitted by the patient on 1/6/2022  General Symptoms: No  Skin Symptoms: No  HENT Symptoms: No  EYE SYMPTOMS: No  HEART SYMPTOMS: No  LUNG SYMPTOMS: No  INTESTINAL SYMPTOMS: No  URINARY SYMPTOMS: No  GYNECOLOGIC SYMPTOMS: No  BREAST SYMPTOMS: No  SKELETAL SYMPTOMS: No  BLOOD SYMPTOMS: No  NERVOUS SYSTEM SYMPTOMS: No  MENTAL HEALTH SYMPTOMS: No        Physical Exam:     Vitals:    01/06/22 1250   BP: 124/83   Pulse: 75   SpO2: 98%       General Appearance: No distress, seated comfortably  Mood: tearful.   HE ENT: Non constricted pupils  Respiratory: Non labored breathing  GI: Soft, non distended, no TTP  Skin: No rashes over exposed skin  MS: moves all extremities against gravity  Neuro: grossly intact  Gait: non antalgic, ambulates with out assistance, she is able to heel and toe walk    Pain specific exam:    Facet loading negative.     Laboratory results:  No results for input(s): NA, POTASSIUM, CHLORIDE, CO2,  ANIONGAP, GLC, BUN, CR, SERGIO in the last 93138 hours.    CBC RESULTS: No results for input(s): WBC, RBC, HGB, HCT, MCV, MCH, MCHC, RDW, PLT in the last 68477 hours.      Imaging:       ASSESSMENT AND PLAN:     Encounter Diagnosis:    Thoracic disc degeneration  Thoracic radiculopathy  Chronic abdominal pain    Clara Chávez is a 31 year old y.o. old female who presents to the pain clinic with chronic abdominal pain and right interscapular area of pain.     I have summarized the patient s past medical history, discussed their clinical findings and the potential differential diagnosis with the patient. Significant past medical history pertinent to the patient s current condition includes interesting pain pattern, well localized pain and burning character of pain. Her pain does not have association with diet or bowel habits.     The clinical findings reveal normal neurologic exam. Her presentation is characteristic of neuropathic pain and correlates with the extensive thoracic spine findings. She has minimal complaints relating to nociceptive type of process although a mixed picture is certainly possible.      The differential diagnosis discussed with the patient are listed above. I have discussed anatomy and possible sources of the pain using models and/or pictures (diagrams). I have discussed multi- disciplinary pain management options withthe patient as pertaining to their case as detailed above. The pain management options we discussed included, but were not limited to the recommendations below.  I also discussed with patient the risks, benefits and alternatives to each pain management option.  All of the patient s questions and concerns were answered to the best of my ability.    RECOMMENDATIONS:     1. Medications: We are prescribing the patient duloxetine 30 mg daily for 7 days then increase to 60 mg daily. Dosing, side effects, risks/benefits/alternatives were discussed with the patient in detail.    2.  Procedure: We are scheduling the patient for T9-10 epidural steroid injection with fluoroscopy in the procedure suite. Risks/benefits/alternatives were discussed.     I also discussed with the patient that the possible risks involved with interventional treatment included, but are not limited to, no pain control, worsened pain, stroke,seizure, spinal headache, allergic reactions, introduction of infection or bleeding which may lead to emergent spine surgery, nerve damage, paralysis oreven death.    3. Physical therapy: I have refered the patient for outpatient physical therapy for stretching, strengthening and home exercise program for thoracic radicular pain. The patient will also discuss spine care and posture. Pool therapy and stretches can be considered if available.    Follow up: 4 weeks after TESI.           Having a Thoracic Epidural Injection  A thoracic epidural injection is a shot that helps ease pain in your upper to middle back (thoracic) area. Medicine is injected into the area around your spinal cord.   What to tell your healthcare provider  Tell your healthcare provider about all the medicines you take. This includes over-the-counter medicines such as ibuprofen. It also includes vitamins, herbs, and other supplements. And tell your healthcare provider if you:     Have had any recent changes in your health, such as an infection or fever    Are sensitive or allergic to contrast dye    Are sensitive or allergic to any medicines, latex, tape, or anesthesia (local and general)    Are pregnant or think you could be   Tests before your procedure  You may need other tests before you get the injection. For example, you may have an MRI scan of your upper to middle back area.   Getting ready for your procedure             Talk with your healthcare provider how to get ready for your procedure. You may need to stop taking some medicines before the procedure, such as blood thinners and aspirin.   Also, make sure to  do the following:    Ask a family member or friend to take you home from the hospital.    Don't eat or drink for several hours before your procedure.    Follow all other instructions from your healthcare provider.  You will be asked to sign a consent form that gives your permission to do the procedure. Read the form carefully. Ask questions if something is not clear.   On the day of your procedure  You may have the procedure at the hospital, a surgery center, or a clinic. A pain specialist, radiologist, or other type of specialist may do the procedure. Your healthcare provider can tell you exactly what to expect. In general:     You may be given medicine to make you feel relaxed and sleepy (sedation).    You will lie on your belly (abdomen) or on your side on a special table.    The healthcare provider will clean the skin on your back in the area of the injection.    He or she will inject some medicine to numb the area (local anesthetic).    The provider will use special X-ray (fluoroscopy or CT scan) to put the needle into the correct area.    He or she will inject contrast dye to make sure the needle is in the epidural space.    He or she will slowly inject medicine. It's usually a mix of anti-inflammatory medicine (steroid) and numbing medicine (anesthetic). You may feel slight discomfort and pressure.    Once done, the provider will remove the needle. He or she may cover the injection site with a small bandage.    The steroid medicine lessens swelling and nerve irritation. The anesthetic medicine numbs the area.  After your procedure  After the procedure, you will be moved to a chair or bed to rest for a while. The healthcare staff will watch you for any problems. You should be able to go home in an hour or so. Make sure you follow all instructions from your healthcare provider and the staff. You may be told to do the following:     Rest for the rest of the day, and go back to your normal activities the next  day.    Don't drive or make any important decisions for at least 24 hours, if you had sedation.    Expect some numbness in your arms that will go away within a few hours.  Recovering at home  You may not notice any improvement right after the injection. You may even feel a little worse afterward. It may take a few days to a week until your pain lessens. The pain relief may last for weeks or months. Or the pain may not return at all. Sometimes no pain relief occurs.   Follow-up care  You ll have an appointment with your healthcare provider in 1 to 2 weeks to discuss:    If the injection has lessened your pain    If you need additional injections    Other treatment you may need, such as exercises, medicines, and physical therapy  When to call your healthcare provider  Call your healthcare provider right away if you have any of these:    Fever of 100.4 F (38.0 C) or higher, or as advised by your provider    Numbness at the injection site that doesn t go away    Warmth and redness at the injection site, or in your arms, that doesn't go away    Pain that is getting worse  [email or web address] last reviewed this educational content on 10/1/2019    2252-2821 [employer]. All rights reserved. This information is not intended as a substitute for professional medical care. Always follow your healthcare professional's instructions.    Understanding Thoracic Epidural Injection  A thoracic epidural injection is a shot that helps ease pain in your upper to middle back (thoracic) area. Medicine is injected into the area around your spinal cord.   About the spinal cord  Your spinal cord is a bundle of nerves that runs from the base of your brain to your lower back. The spinal nerves run off either side of it. Your spinal cord is surrounded and protected by your backbone (spine). It's made up of many small bones stacked on top of each other (vertebrae). Cushions (intervertebral disks) are between the vertebrae. The space between  the covering of the spinal cord (dura) and the spine is the epidural space.   Why a thoracic epidural injection is done  A spinal nerve can become pinched or inflamed. That may happen if a disk presses on a nerve. This can happen anywhere along your spinal cord and spine. It may cause pain in the area. The pain may also extend to other areas of your body.   A thoracic epidural injection may give pain relief for problems that affect the thoracic spine such as:     Part of a disk squeezing against a nerve (disk herniation)    Narrowing in the space for the nerve (spinal stenosis)    Long-term pain after back surgery (post-surgery spine syndrome)    An injury that irritates a spinal nerve  A thoracic epidural injection may be done if other treatments don t work to ease your pain. The injection may help ease pain in your middle to upper back for weeks to months or longer. You may have more than one injection, but not usually more than one in a month.   An epidural injection may also be used to help diagnose the source of your pain. Your healthcare provider will inject numbing medicine (local anesthetic) into the epidural space. If your pain quickly goes away, your healthcare provider will know that a nerve is causing your pain. He or she can then treat your pain better.   How thoracic epidural injection is done   A pain specialist, radiologist, or other type of specialist may do the procedure. You will lie on your belly (abdomen) or on your side on a special table. Your healthcare provider will use special X-ray (fluoroscopy or CT scan) to put the needle into the correct area. He or she will inject contrast dye to make sure the needle is in the epidural space. He or she will slowly inject medicine. It's usually a mix of anti-inflammatory medicine (steroid) and numbing medicine (anesthetic). The steroid medicine lessens swelling and nerve irritation. The anesthetic medicine numbs the area. You may feel slight discomfort  and pressure when the medicine is injected. This usually disappears once all of the medicine is injected.   Risks of thoracic epidural injection  Most procedures have some risks. The risks of this procedure include:    Needle puncture of the covering of the spinal cord (dura) that causes a spinal fluid leak and severe headache    Infection    Bleeding    Injury to a nerve    No pain relief    Muscle weakness    Paralysis    Reaction to the medicine such as rash or hot flashes  Your own risks may differ. They depend on your age, overall health, and other factors. You may not be able to have an injection if you have a health condition such as:     Infection    Bleeding disorder    Uncontrolled high blood pressure    Diabetes    Heart-related chest pain (unstable angina)    Heart failure    Allergy to contrast dye, local anesthetics, or steroids  Talk with your healthcare provider about your specific risks.  [employer] last reviewed this educational content on 5/1/2020 2000-2021 [employer]. All rights reserved. This information is not intended as a substitute for professional medical care. Always follow your healthcare professional's instructions.

## 2022-01-06 NOTE — LETTER
1/6/2022       RE: Clara Chávez  [address] [facility] [address]     Dear Colleague,    Thank you for referring your patient, Clara Chávez, to the [facility] [place] at [facility]. Please see a copy of my visit note below.      Pain Clinic New Patient Consult Note:    Referring Provider: Prakash   Primary care provider: Iris Ref-Primary, Physician.    Clara Chávez is a 31 year old y.o. old female who presents to the pain clinic with chronic abdominal pain.    HPI:  Patient Supplied Answers To the [facility] Pain Questionnaire  [facility] Pain -  Patient Entered Questionnaire/Answers 1/6/2022   What number best describes your pain right now:  0 = No pain  to  10 = Worst pain imaginable 8   How would you describe the pain? burning, sharp, dull, aching   Which of the following worsen your pain? nothing worsens the pain   Which of the following improve or reduce your pain?  medication   What number best describes your average pain for the past week:  0 = No pain  to  10 = Worst pain imaginable 9   What number best describes your LOWEST pain in past 24 hours:  0 = No pain  to  10 = Worst pain imaginable 4   What number best describes your WORST pain in past 24 hours:  0 = No pain  to  10 = Worst pain imaginable 9   When is your pain worst? Night   Have you tried treating your pain with medication?  Yes   Are you currently taking medications for your pain? Yes     Calra is a 31 years old female who is tearful with puffy eyes as I enter this clinical examination room with her seated in the patient chair. She describes severe epigastric pain mostly left, sometimes to the right and middle back and right shoulder blade ( interscapular area). She had extensive work up for possible chronic pancreatitis. She reports she has had 2 attacks for pancreatitis. She is experiencing severe chronic pain since November of 2020  "mostly \"burning\" in nature. Sometimes the pain is  just in the back lower thoracic spine area. Back pain is also burning type of pain. In the recent past, this pain was constant and interrupted her pain. Constant in the right shoulder ( medial interscapular area)  Worse- not sure.   Later she reports that putting her 4 yo son in the car seat causes her to have severe epigastric burning type of pain.   Better- tramadol. Takes the edge off if it.     Pain treatments:    Herbal medicines: marshmallow root, turmeric  Physical therapy: exercises at PT, [place]  Chiropractor: yes, did not help, April 2021  Pain physician: none  Surgery: cholecystectomy, egd, colonoscopy  Biofeedback: none  Acupuncture: none    Tests/Imaging reviewed with the patient:    Thoracic spine 1/29/2021  T5-6 Shallow central disc protrusion mildly indents the cord and minimally narrows the spinal canal.     T8-9 Shallow central disc protrusion minimally narrows the spinal canal.   T10-11 Shallow left central disc protrusion minimally narrows the spinal canal.   T11-12 Shallow posterior disc bulge minimally narros spinal canal.   Comments: Mild to moderate facet arthropathy and small disc protrusions.   Schmorl's nodes at T5-6 through T12-L1.     Significant Medical History:   No past medical history on file.       Past Surgical History:  No past surgical history on file.       Family History:  No family history on file.       Social History:  Social History     Socioeconomic History     Marital status: Single     Spouse name: Not on file     Number of children: Not on file     Years of education: Not on file     Highest education level: Not on file   Occupational History     Not on file   Tobacco Use     Smoking status: Never Smoker     Smokeless tobacco: Never Used   Substance and Sexual Activity     Alcohol use: Not on file     Drug use: Not on file     Sexual activity: Not on file   Other Topics Concern     Not on file   Social History " Narrative     Not on file     Social Determinants of Health     Financial Resource Strain: Not on file   Food Insecurity: Not on file   Transportation Needs: Not on file   Physical Activity: Not on file   Stress: Not on file   Social Connections: Not on file   Intimate Partner Violence: Not on file   Housing Stability: Not on file     Social History     Social History Narrative     Not on file          Allergies:  No Known Allergies    Current Medications:   Current Outpatient Medications   Medication Sig Dispense Refill     sucralfate (CARAFATE) 1 GM tablet Take 1 tablet (1 g) by mouth 4 times daily (before meals and nightly) 360 tablet 3     traMADol (ULTRAM) 50 MG tablet Take 50 mg by mouth every 6 hours as needed for severe pain            Current Pain Medications:  Medications related to Pain Management (From now, onward)    None           Past Pain Medications:    amitritylline 25 mg only, did not escalate dose, did not help at that dose. No side effect.   Gabapentin in the past- more than 1 year, unclear about dose.     Blood thinner:    none    Work History:    Current work status: staff a nursing    Psychosocial History:     History of treatment for behavioral disorder: none  History of suicidal ideation or suicidal attempt: none    Review of Systems:  Review of Systems   All other systems reviewed and are negative.    Answers for HPI/ROS submitted by the patient on 1/6/2022  General Symptoms: No  Skin Symptoms: No  HENT Symptoms: No  EYE SYMPTOMS: No  HEART SYMPTOMS: No  LUNG SYMPTOMS: No  INTESTINAL SYMPTOMS: No  URINARY SYMPTOMS: No  GYNECOLOGIC SYMPTOMS: No  BREAST SYMPTOMS: No  SKELETAL SYMPTOMS: No  BLOOD SYMPTOMS: No  NERVOUS SYSTEM SYMPTOMS: No  MENTAL HEALTH SYMPTOMS: No        Physical Exam:     Vitals:    01/06/22 1250   BP: 124/83   Pulse: 75   SpO2: 98%       General Appearance: No distress, seated comfortably  Mood: tearful.   HE ENT: Non constricted pupils  Respiratory: Non labored  breathing  GI: Soft, non distended, no TTP  Skin: No rashes over exposed skin  MS: moves all extremities against gravity  Neuro: grossly intact  Gait: non antalgic, ambulates with out assistance, she is able to heel and toe walk    Pain specific exam:    Facet loading negative.     Laboratory results:  No results for input(s): NA, POTASSIUM, CHLORIDE, CO2, ANIONGAP, GLC, BUN, CR, SERGIO in the last 52311 hours.    CBC RESULTS: No results for input(s): WBC, RBC, HGB, HCT, MCV, MCH, MCHC, RDW, PLT in the last 92503 hours.      Imaging:       ASSESSMENT AND PLAN:     Encounter Diagnosis:    Thoracic disc degeneration  Thoracic radiculopathy  Chronic abdominal pain    Clara Chávez is a 31 year old y.o. old female who presents to the pain clinic with chronic abdominal pain and right interscapular area of pain.     I have summarized the patient s past medical history, discussed their clinical findings and the potential differential diagnosis with the patient. Significant past medical history pertinent to the patient s current condition includes interesting pain pattern, well localized pain and burning character of pain. Her pain does not have association with diet or bowel habits.     The clinical findings reveal normal neurologic exam. Her presentation is characteristic of neuropathic pain and correlates with the extensive thoracic spine findings. She has minimal complaints relating to nociceptive type of process although a mixed picture is certainly possible.      The differential diagnosis discussed with the patient are listed above. I have discussed anatomy and possible sources of the pain using models and/or pictures (diagrams). I have discussed multi- disciplinary pain management options withthe patient as pertaining to their case as detailed above. The pain management options we discussed included, but were not limited to the recommendations below.  I also discussed with patient the risks, benefits and  alternatives to each pain management option.  All of the patient s questions and concerns were answered to the best of my ability.    RECOMMENDATIONS:     1. Medications: We are prescribing the patient duloxetine 30 mg daily for 7 days then increase to 60 mg daily. Dosing, side effects, risks/benefits/alternatives were discussed with the patient in detail.    2. Procedure: We are scheduling the patient for T9-10 epidural steroid injection with fluoroscopy in the procedure suite. Risks/benefits/alternatives were discussed.     I also discussed with the patient that the possible risks involved with interventional treatment included, but are not limited to, no pain control, worsened pain, stroke,seizure, spinal headache, allergic reactions, introduction of infection or bleeding which may lead to emergent spine surgery, nerve damage, paralysis oreven death.    3. Physical therapy: I have refered the patient for outpatient physical therapy for stretching, strengthening and home exercise program for thoracic radicular pain. The patient will also discuss spine care and posture. Pool therapy and stretches can be considered if available.    Follow up: 4 weeks after TESI.           Having a Thoracic Epidural Injection  A thoracic epidural injection is a shot that helps ease pain in your upper to middle back (thoracic) area. Medicine is injected into the area around your spinal cord.   What to tell your healthcare provider  Tell your healthcare provider about all the medicines you take. This includes over-the-counter medicines such as ibuprofen. It also includes vitamins, herbs, and other supplements. And tell your healthcare provider if you:     Have had any recent changes in your health, such as an infection or fever    Are sensitive or allergic to contrast dye    Are sensitive or allergic to any medicines, latex, tape, or anesthesia (local and general)    Are pregnant or think you could be   Tests before your procedure  You  may need other tests before you get the injection. For example, you may have an MRI scan of your upper to middle back area.   Getting ready for your procedure             Talk with your healthcare provider how to get ready for your procedure. You may need to stop taking some medicines before the procedure, such as blood thinners and aspirin.   Also, make sure to do the following:    Ask a family member or friend to take you home from the hospital.    Don't eat or drink for several hours before your procedure.    Follow all other instructions from your healthcare provider.  You will be asked to sign a consent form that gives your permission to do the procedure. Read the form carefully. Ask questions if something is not clear.   On the day of your procedure  You may have the procedure at the hospital, a surgery center, or a clinic. A pain specialist, radiologist, or other type of specialist may do the procedure. Your healthcare provider can tell you exactly what to expect. In general:     You may be given medicine to make you feel relaxed and sleepy (sedation).    You will lie on your belly (abdomen) or on your side on a special table.    The healthcare provider will clean the skin on your back in the area of the injection.    He or she will inject some medicine to numb the area (local anesthetic).    The provider will use special X-ray (fluoroscopy or CT scan) to put the needle into the correct area.    He or she will inject contrast dye to make sure the needle is in the epidural space.    He or she will slowly inject medicine. It's usually a mix of anti-inflammatory medicine (steroid) and numbing medicine (anesthetic). You may feel slight discomfort and pressure.    Once done, the provider will remove the needle. He or she may cover the injection site with a small bandage.    The steroid medicine lessens swelling and nerve irritation. The anesthetic medicine numbs the area.  After your procedure  After the  procedure, you will be moved to a chair or bed to rest for a while. The healthcare staff will watch you for any problems. You should be able to go home in an hour or so. Make sure you follow all instructions from your healthcare provider and the staff. You may be told to do the following:     Rest for the rest of the day, and go back to your normal activities the next day.    Don't drive or make any important decisions for at least 24 hours, if you had sedation.    Expect some numbness in your arms that will go away within a few hours.  Recovering at home  You may not notice any improvement right after the injection. You may even feel a little worse afterward. It may take a few days to a week until your pain lessens. The pain relief may last for weeks or months. Or the pain may not return at all. Sometimes no pain relief occurs.   Follow-up care  You ll have an appointment with your healthcare provider in 1 to 2 weeks to discuss:    If the injection has lessened your pain    If you need additional injections    Other treatment you may need, such as exercises, medicines, and physical therapy  When to call your healthcare provider  Call your healthcare provider right away if you have any of these:    Fever of 100.4 F (38.0 C) or higher, or as advised by your provider    Numbness at the injection site that doesn t go away    Warmth and redness at the injection site, or in your arms, that doesn't go away    Pain that is getting worse  Keiko last reviewed this educational content on 10/1/2019    9750-7058 [employer]. All rights reserved. This information is not intended as a substitute for professional medical care. Always follow your healthcare professional's instructions.    Understanding Thoracic Epidural Injection  A thoracic epidural injection is a shot that helps ease pain in your upper to middle back (thoracic) area. Medicine is injected into the area around your spinal cord.   About the spinal  cord  Your spinal cord is a bundle of nerves that runs from the base of your brain to your lower back. The spinal nerves run off either side of it. Your spinal cord is surrounded and protected by your backbone (spine). It's made up of many small bones stacked on top of each other (vertebrae). Cushions (intervertebral disks) are between the vertebrae. The space between the covering of the spinal cord (dura) and the spine is the epidural space.   Why a thoracic epidural injection is done  A spinal nerve can become pinched or inflamed. That may happen if a disk presses on a nerve. This can happen anywhere along your spinal cord and spine. It may cause pain in the area. The pain may also extend to other areas of your body.   A thoracic epidural injection may give pain relief for problems that affect the thoracic spine such as:     Part of a disk squeezing against a nerve (disk herniation)    Narrowing in the space for the nerve (spinal stenosis)    Long-term pain after back surgery (post-surgery spine syndrome)    An injury that irritates a spinal nerve  A thoracic epidural injection may be done if other treatments don t work to ease your pain. The injection may help ease pain in your middle to upper back for weeks to months or longer. You may have more than one injection, but not usually more than one in a month.   An epidural injection may also be used to help diagnose the source of your pain. Your healthcare provider will inject numbing medicine (local anesthetic) into the epidural space. If your pain quickly goes away, your healthcare provider will know that a nerve is causing your pain. He or she can then treat your pain better.   How thoracic epidural injection is done   A pain specialist, radiologist, or other type of specialist may do the procedure. You will lie on your belly (abdomen) or on your side on a special table. Your healthcare provider will use special X-ray (fluoroscopy or CT scan) to put the needle  into the correct area. He or she will inject contrast dye to make sure the needle is in the epidural space. He or she will slowly inject medicine. It's usually a mix of anti-inflammatory medicine (steroid) and numbing medicine (anesthetic). The steroid medicine lessens swelling and nerve irritation. The anesthetic medicine numbs the area. You may feel slight discomfort and pressure when the medicine is injected. This usually disappears once all of the medicine is injected.   Risks of thoracic epidural injection  Most procedures have some risks. The risks of this procedure include:    Needle puncture of the covering of the spinal cord (dura) that causes a spinal fluid leak and severe headache    Infection    Bleeding    Injury to a nerve    No pain relief    Muscle weakness    Paralysis    Reaction to the medicine such as rash or hot flashes  Your own risks may differ. They depend on your age, overall health, and other factors. You may not be able to have an injection if you have a health condition such as:     Infection    Bleeding disorder    Uncontrolled high blood pressure    Diabetes    Heart-related chest pain (unstable angina)    Heart failure    Allergy to contrast dye, local anesthetics, or steroids  Talk with your healthcare provider about your specific risks.  Twillion last reviewed this educational content on 5/1/2020 2000-2021 [employer]. All rights reserved. This information is not intended as a substitute for professional medical care. Always follow your healthcare professional's instructions.    Again, thank you for allowing me to participate in the care of your patient.      Sincerely,    Brenda Shell MD

## 2022-01-06 NOTE — LETTER
"1/6/2022      RE: Clara Chávez  [address] [facility] [place] [address]         Pain Clinic New Patient Consult Note:    Referring Provider: Prakash   Primary care provider: Iris Ref-Primary, Physician.    Clara Chávez is a 31 year old y.o. old female who presents to the pain clinic with chronic abdominal pain.    HPI:  Patient Supplied Answers To the [facility] Pain Questionnaire  [facility] Pain -  Patient Entered Questionnaire/Answers 1/6/2022   What number best describes your pain right now:  0 = No pain  to  10 = Worst pain imaginable 8   How would you describe the pain? burning, sharp, dull, aching   Which of the following worsen your pain? nothing worsens the pain   Which of the following improve or reduce your pain?  medication   What number best describes your average pain for the past week:  0 = No pain  to  10 = Worst pain imaginable 9   What number best describes your LOWEST pain in past 24 hours:  0 = No pain  to  10 = Worst pain imaginable 4   What number best describes your WORST pain in past 24 hours:  0 = No pain  to  10 = Worst pain imaginable 9   When is your pain worst? Night   Have you tried treating your pain with medication?  Yes   Are you currently taking medications for your pain? Yes     Clara is a 31 years old female who is tearful with puffy eyes as I enter this clinical examination room with her seated in the patient chair. She describes severe epigastric pain mostly left, sometimes to the right and middle back and right shoulder blade ( interscapular area). She had extensive work up for possible chronic pancreatitis. She reports she has had 2 attacks for pancreatitis. She is experiencing severe chronic pain since November of 2020 mostly \"burning\" in nature. Sometimes the pain is  just in the back lower thoracic spine area. Back pain is also burning type of pain. In the recent past, this pain was constant and interrupted her pain. Constant in the right shoulder ( medial interscapular " area)  Worse- not sure.   Later she reports that putting her 4 yo son in the car seat causes her to have severe epigastric burning type of pain.   Better- tramadol. Takes the edge off if it.     Pain treatments:    Herbal medicines: marshmallow root, turmeric  Physical therapy: exercises at PT, [place]  Chiropractor: yes, did not help, April 2021  Pain physician: none  Surgery: cholecystectomy, egd, colonoscopy  Biofeedback: none  Acupuncture: none    Tests/Imaging reviewed with the patient:    Thoracic spine 1/29/2021  T5-6 Shallow central disc protrusion mildly indents the cord and minimally narrows the spinal canal.     T8-9 Shallow central disc protrusion minimally narrows the spinal canal.   T10-11 Shallow left central disc protrusion minimally narrows the spinal canal.   T11-12 Shallow posterior disc bulge minimally narros spinal canal.   Comments: Mild to moderate facet arthropathy and small disc protrusions.   Schmorl's nodes at T5-6 through T12-L1.     Significant Medical History:   No past medical history on file.       Past Surgical History:  No past surgical history on file.       Family History:  No family history on file.       Social History:  Social History     Socioeconomic History     Marital status: Single     Spouse name: Not on file     Number of children: Not on file     Years of education: Not on file     Highest education level: Not on file   Occupational History     Not on file   Tobacco Use     Smoking status: Never Smoker     Smokeless tobacco: Never Used   Substance and Sexual Activity     Alcohol use: Not on file     Drug use: Not on file     Sexual activity: Not on file   Other Topics Concern     Not on file   Social History Narrative     Not on file     Social Determinants of Health     Financial Resource Strain: Not on file   Food Insecurity: Not on file   Transportation Needs: Not on file   Physical Activity: Not on file   Stress: Not on file   Social Connections: Not on file    Intimate Partner Violence: Not on file   Housing Stability: Not on file     Social History     Social History Narrative     Not on file          Allergies:  No Known Allergies    Current Medications:   Current Outpatient Medications   Medication Sig Dispense Refill     sucralfate (CARAFATE) 1 GM tablet Take 1 tablet (1 g) by mouth 4 times daily (before meals and nightly) 360 tablet 3     traMADol (ULTRAM) 50 MG tablet Take 50 mg by mouth every 6 hours as needed for severe pain            Current Pain Medications:  Medications related to Pain Management (From now, onward)    None           Past Pain Medications:    amitritylline 25 mg only, did not escalate dose, did not help at that dose. No side effect.   Gabapentin in the past- more than 1 year, unclear about dose.     Blood thinner:    none    Work History:    Current work status: staff a nursing    Psychosocial History:     History of treatment for behavioral disorder: none  History of suicidal ideation or suicidal attempt: none    Review of Systems:  Review of Systems   All other systems reviewed and are negative.    Answers for HPI/ROS submitted by the patient on 1/6/2022  General Symptoms: No  Skin Symptoms: No  HENT Symptoms: No  EYE SYMPTOMS: No  HEART SYMPTOMS: No  LUNG SYMPTOMS: No  INTESTINAL SYMPTOMS: No  URINARY SYMPTOMS: No  GYNECOLOGIC SYMPTOMS: No  BREAST SYMPTOMS: No  SKELETAL SYMPTOMS: No  BLOOD SYMPTOMS: No  NERVOUS SYSTEM SYMPTOMS: No  MENTAL HEALTH SYMPTOMS: No        Physical Exam:     Vitals:    01/06/22 1250   BP: 124/83   Pulse: 75   SpO2: 98%       General Appearance: No distress, seated comfortably  Mood: tearful.   HE ENT: Non constricted pupils  Respiratory: Non labored breathing  GI: Soft, non distended, no TTP  Skin: No rashes over exposed skin  MS: moves all extremities against gravity  Neuro: grossly intact  Gait: non antalgic, ambulates with out assistance, she is able to heel and toe walk    Pain specific exam:    Facet loading  negative.     Laboratory results:  No results for input(s): NA, POTASSIUM, CHLORIDE, CO2, ANIONGAP, GLC, BUN, CR, SERGIO in the last 27837 hours.    CBC RESULTS: No results for input(s): WBC, RBC, HGB, HCT, MCV, MCH, MCHC, RDW, PLT in the last 42873 hours.      Imaging:       ASSESSMENT AND PLAN:     Encounter Diagnosis:    Thoracic disc degeneration  Thoracic radiculopathy  Chronic abdominal pain    Clara Chávez is a 31 year old y.o. old female who presents to the pain clinic with chronic abdominal pain and right interscapular area of pain.     I have summarized the patient s past medical history, discussed their clinical findings and the potential differential diagnosis with the patient. Significant past medical history pertinent to the patient s current condition includes interesting pain pattern, well localized pain and burning character of pain. Her pain does not have association with diet or bowel habits.     The clinical findings reveal normal neurologic exam. Her presentation is characteristic of neuropathic pain and correlates with the extensive thoracic spine findings. She has minimal complaints relating to nociceptive type of process although a mixed picture is certainly possible.      The differential diagnosis discussed with the patient are listed above. I have discussed anatomy and possible sources of the pain using models and/or pictures (diagrams). I have discussed multi- disciplinary pain management options withthe patient as pertaining to their case as detailed above. The pain management options we discussed included, but were not limited to the recommendations below.  I also discussed with patient the risks, benefits and alternatives to each pain management option.  All of the patient s questions and concerns were answered to the best of my ability.    RECOMMENDATIONS:     1. Medications: We are prescribing the patient duloxetine 30 mg daily for 7 days then increase to 60 mg daily. Dosing, side  effects, risks/benefits/alternatives were discussed with the patient in detail.    2. Procedure: We are scheduling the patient for T9-10 epidural steroid injection with fluoroscopy in the procedure suite. Risks/benefits/alternatives were discussed.     I also discussed with the patient that the possible risks involved with interventional treatment included, but are not limited to, no pain control, worsened pain, stroke,seizure, spinal headache, allergic reactions, introduction of infection or bleeding which may lead to emergent spine surgery, nerve damage, paralysis oreven death.    3. Physical therapy: I have refered the patient for outpatient physical therapy for stretching, strengthening and home exercise program for thoracic radicular pain. The patient will also discuss spine care and posture. Pool therapy and stretches can be considered if available.    Follow up: 4 weeks after TESI.           Having a Thoracic Epidural Injection  A thoracic epidural injection is a shot that helps ease pain in your upper to middle back (thoracic) area. Medicine is injected into the area around your spinal cord.   What to tell your healthcare provider  Tell your healthcare provider about all the medicines you take. This includes over-the-counter medicines such as ibuprofen. It also includes vitamins, herbs, and other supplements. And tell your healthcare provider if you:     Have had any recent changes in your health, such as an infection or fever    Are sensitive or allergic to contrast dye    Are sensitive or allergic to any medicines, latex, tape, or anesthesia (local and general)    Are pregnant or think you could be   Tests before your procedure  You may need other tests before you get the injection. For example, you may have an MRI scan of your upper to middle back area.   Getting ready for your procedure             Talk with your healthcare provider how to get ready for your procedure. You may need to stop taking some  medicines before the procedure, such as blood thinners and aspirin.   Also, make sure to do the following:    Ask a family member or friend to take you home from the hospital.    Don't eat or drink for several hours before your procedure.    Follow all other instructions from your healthcare provider.  You will be asked to sign a consent form that gives your permission to do the procedure. Read the form carefully. Ask questions if something is not clear.   On the day of your procedure  You may have the procedure at the hospital, a surgery center, or a clinic. A pain specialist, radiologist, or other type of specialist may do the procedure. Your healthcare provider can tell you exactly what to expect. In general:     You may be given medicine to make you feel relaxed and sleepy (sedation).    You will lie on your belly (abdomen) or on your side on a special table.    The healthcare provider will clean the skin on your back in the area of the injection.    He or she will inject some medicine to numb the area (local anesthetic).    The provider will use special X-ray (fluoroscopy or CT scan) to put the needle into the correct area.    He or she will inject contrast dye to make sure the needle is in the epidural space.    He or she will slowly inject medicine. It's usually a mix of anti-inflammatory medicine (steroid) and numbing medicine (anesthetic). You may feel slight discomfort and pressure.    Once done, the provider will remove the needle. He or she may cover the injection site with a small bandage.    The steroid medicine lessens swelling and nerve irritation. The anesthetic medicine numbs the area.  After your procedure  After the procedure, you will be moved to a chair or bed to rest for a while. The healthcare staff will watch you for any problems. You should be able to go home in an hour or so. Make sure you follow all instructions from your healthcare provider and the staff. You may be told to do the  following:     Rest for the rest of the day, and go back to your normal activities the next day.    Don't drive or make any important decisions for at least 24 hours, if you had sedation.    Expect some numbness in your arms that will go away within a few hours.  Recovering at home  You may not notice any improvement right after the injection. You may even feel a little worse afterward. It may take a few days to a week until your pain lessens. The pain relief may last for weeks or months. Or the pain may not return at all. Sometimes no pain relief occurs.   Follow-up care  You ll have an appointment with your healthcare provider in 1 to 2 weeks to discuss:    If the injection has lessened your pain    If you need additional injections    Other treatment you may need, such as exercises, medicines, and physical therapy  When to call your healthcare provider  Call your healthcare provider right away if you have any of these:    Fever of 100.4 F (38.0 C) or higher, or as advised by your provider    Numbness at the injection site that doesn t go away    Warmth and redness at the injection site, or in your arms, that doesn't go away    Pain that is getting worse  [employer] last reviewed this educational content on 10/1/2019    0870-3605 [employer]. All rights reserved. This information is not intended as a substitute for professional medical care. Always follow your healthcare professional's instructions.    Understanding Thoracic Epidural Injection  A thoracic epidural injection is a shot that helps ease pain in your upper to middle back (thoracic) area. Medicine is injected into the area around your spinal cord.   About the spinal cord  Your spinal cord is a bundle of nerves that runs from the base of your brain to your lower back. The spinal nerves run off either side of it. Your spinal cord is surrounded and protected by your backbone (spine). It's made up of many small bones stacked on top of each other  (vertebrae). Cushions (intervertebral disks) are between the vertebrae. The space between the covering of the spinal cord (dura) and the spine is the epidural space.   Why a thoracic epidural injection is done  A spinal nerve can become pinched or inflamed. That may happen if a disk presses on a nerve. This can happen anywhere along your spinal cord and spine. It may cause pain in the area. The pain may also extend to other areas of your body.   A thoracic epidural injection may give pain relief for problems that affect the thoracic spine such as:     Part of a disk squeezing against a nerve (disk herniation)    Narrowing in the space for the nerve (spinal stenosis)    Long-term pain after back surgery (post-surgery spine syndrome)    An injury that irritates a spinal nerve  A thoracic epidural injection may be done if other treatments don t work to ease your pain. The injection may help ease pain in your middle to upper back for weeks to months or longer. You may have more than one injection, but not usually more than one in a month.   An epidural injection may also be used to help diagnose the source of your pain. Your healthcare provider will inject numbing medicine (local anesthetic) into the epidural space. If your pain quickly goes away, your healthcare provider will know that a nerve is causing your pain. He or she can then treat your pain better.   How thoracic epidural injection is done   A pain specialist, radiologist, or other type of specialist may do the procedure. You will lie on your belly (abdomen) or on your side on a special table. Your healthcare provider will use special X-ray (fluoroscopy or CT scan) to put the needle into the correct area. He or she will inject contrast dye to make sure the needle is in the epidural space. He or she will slowly inject medicine. It's usually a mix of anti-inflammatory medicine (steroid) and numbing medicine (anesthetic). The steroid medicine lessens swelling and  nerve irritation. The anesthetic medicine numbs the area. You may feel slight discomfort and pressure when the medicine is injected. This usually disappears once all of the medicine is injected.   Risks of thoracic epidural injection  Most procedures have some risks. The risks of this procedure include:    Needle puncture of the covering of the spinal cord (dura) that causes a spinal fluid leak and severe headache    Infection    Bleeding    Injury to a nerve    No pain relief    Muscle weakness    Paralysis    Reaction to the medicine such as rash or hot flashes  Your own risks may differ. They depend on your age, overall health, and other factors. You may not be able to have an injection if you have a health condition such as:     Infection    Bleeding disorder    Uncontrolled high blood pressure    Diabetes    Heart-related chest pain (unstable angina)    Heart failure    Allergy to contrast dye, local anesthetics, or steroids  Talk with your healthcare provider about your specific risks.  [employer] last reviewed this educational content on 5/1/2020 2000-2021 [employer]. All rights reserved. This information is not intended as a substitute for professional medical care. Always follow your healthcare professional's instructions.    Brenda Shell MD

## 2022-01-06 NOTE — NURSING NOTE
Patient presents with:  Consult: UMP NEW,RM8, patient reports, 8/10 pain in her abdomen, shoulder, back      Extreme Pain (8)     Pain Medications     Opioid Agonists Refills Start End     traMADol (ULTRAM) 50 MG tablet          Sig - Route: Take 50 mg by mouth every 6 hours as needed for severe pain - Oral    Class: Historical          What medications are you using for pain?   Tramadol,     (New patients only) Have you been seen by another pain clinic/ provider? N/A      Consult       Jamel Martin, EMT

## 2022-01-19 ENCOUNTER — VIRTUAL VISIT (OUTPATIENT)
Dept: GASTROENTEROLOGY | Facility: CLINIC | Age: 32
End: 2022-01-19
Payer: COMMERCIAL

## 2022-01-19 DIAGNOSIS — K85.00 IDIOPATHIC ACUTE PANCREATITIS, UNSPECIFIED COMPLICATION STATUS: Primary | ICD-10-CM

## 2022-01-19 PROCEDURE — 99212 OFFICE O/P EST SF 10 MIN: CPT | Mod: 95 | Performed by: INTERNAL MEDICINE

## 2022-01-19 NOTE — PROGRESS NOTES
Clara is a 31 year old who is being evaluated via a billable video visit.      How would you like to obtain your AVS? Mail a copy  If the video visit is dropped, the invitation should be resent by: Text to cell phone: [phone]  Will anyone else be joining your video visit? No    Video Start Time: 12:00PM  Video-Visit Details    Type of service:  Video Visit    Video End Time:12:10 PM    Originating Location (pt. Location): Home    Distant Location (provider location):  [facility] [place]     Platform used for Video Visit: [employer]visor     10 minute follow up. Genetics negative. Saw Dr Shell, suspects component of her pain is spinal in origin, planning block. Very reasonable, will see how that goes. Started her on cymbalta. 30 mg inc to 60. We talked about seeing how all that goes, how she is doing after two months. If no improvement could consider repeat ERCP to see if sphincters open. However we discussed that initial ERCP by Dr Waddell of very unproven benefit and hi risk, and may have been incomplete sphincterotomy. Also came out she has not been vaccinated against COVID. Discussed that pancreatitis exacerbated by COVID, not the vaccine, and strongly rec her and entire family get vaccinated.    PLAN  Follow-up 2 months

## 2022-01-19 NOTE — LETTER
1/19/2022         RE: Clara Chávez  [address] [facility] [address]        Dear Colleague,    Thank you for referring your patient, Clara Chávez, to the [facility]. Please see a copy of my visit note below.    Clara is a 31 year old who is being evaluated via a billable video visit.      How would you like to obtain your AVS? Mail a copy  If the video visit is dropped, the invitation should be resent by: Text to cell phone: [phone]  Will anyone else be joining your video visit? No    Video Start Time: 12:00PM  Video-Visit Details    Type of service:  Video Visit    Video End Time:12:10 PM    Originating Location (pt. Location): Home    Distant Location (provider location):  [facility]     Platform used for Video Visit: Qompium     10 minute follow up. Genetics negative. Saw Dr Shell, suspects component of her pain is spinal in origin, planning block. Very reasonable, will see how that goes. Started her on cymbalta. 30 mg inc to 60. We talked about seeing how all that goes, how she is doing after two months. If no improvement could consider repeat ERCP to see if sphincters open. However we discussed that initial ERCP by Dr Waddell of very unproven benefit and hi risk, and may have been incomplete sphincterotomy. Also came out she has not been vaccinated against COVID. Discussed that pancreatitis exacerbated by COVID, not the vaccine, and strongly rec her and entire family get vaccinated.    PLAN  Follow-up 2 months        Again, thank you for allowing me to participate in the care of your patient.        Sincerely,        Abel Randall MD

## 2022-01-19 NOTE — LETTER
Date:February 2, 2022      Patient was self referred, no letter generated. Do not send.        [facility] Health Information

## 2022-01-20 ENCOUNTER — TELEPHONE (OUTPATIENT)
Dept: GASTROENTEROLOGY | Facility: CLINIC | Age: 32
End: 2022-01-20
Payer: COMMERCIAL

## 2022-01-20 NOTE — TELEPHONE ENCOUNTER
Called Pt to follow up on clinic visit from yesterday with Dr. Randall. No answer. Left VM for Pt to call back if she has any questions.   Asked Clinic coordinators to reach out to Pt to schedule a 2 month follow up.    Porter Smalls LPN

## 2022-01-20 NOTE — PATIENT INSTRUCTIONS
Follow up with Dr. Randall in 2 months.      Please call with any questions or concerns regarding your clinic visit today.    It is a pleasure being involved in your health care.    Contacts post-consultation depending on your need:    Schedule Clinic Appointments            [phone] # 1   M-F 7:30 - 5 pm    Ivonne Arias RN Care Coordinator  [phone]    Porter Smalls LPN    [phone]     OR Procedure Scheduling                             [phone]    My Chart is available 24 hours a day and is a secure way to access your records and communicate with your care team.  I strongly recommend signing up if you haven't already done so, if you are comfortable with computers.  If you would like to inquire about this or are having problems with My Chart access, you may call [phone] or go online at [email or web address].  Please allow at least 24 hours for a response and extra time on weekends and Holidays.

## 2022-01-25 ENCOUNTER — TELEPHONE (OUTPATIENT)
Dept: GASTROENTEROLOGY | Facility: CLINIC | Age: 32
End: 2022-01-25
Payer: COMMERCIAL

## 2022-01-25 NOTE — TELEPHONE ENCOUNTER
Left pod number (no mychart), pt to schedule return in about 2 months (around 3/19/2022) with Dr. Randall.

## 2022-02-16 ENCOUNTER — TELEPHONE (OUTPATIENT)
Dept: ANESTHESIOLOGY | Facility: CLINIC | Age: 32
End: 2022-02-16
Payer: COMMERCIAL

## 2022-02-24 ENCOUNTER — TELEPHONE (OUTPATIENT)
Dept: ANESTHESIOLOGY | Facility: CLINIC | Age: 32
End: 2022-02-24
Payer: COMMERCIAL

## 2022-02-24 DIAGNOSIS — Z11.59 ENCOUNTER FOR SCREENING FOR OTHER VIRAL DISEASES: Primary | ICD-10-CM

## 2022-02-24 NOTE — TELEPHONE ENCOUNTER
Patient is scheduled for procedure with Dr. Shell    Spoke with: Patient    Date of Procedure: 03-08-22    Location: [facility]    Informed patient they will need an adult  Yes    Pre-procedure COVID-19 Test: 03-04-22 @ [facility]     Additional comments: N/A    Patient is aware pre-op RN will call 2-3 days prior to procedure with arrival time and instructions. Yes      Belinda Glass on 2/24/2022 at 10:21 AM

## 2022-03-08 ENCOUNTER — HOSPITAL ENCOUNTER (OUTPATIENT)
Facility: AMBULATORY SURGERY CENTER | Age: 32
Discharge: HOME | End: 2022-03-08
Attending: ANESTHESIOLOGY
Payer: COMMERCIAL

## 2022-11-29 RX ORDER — HEPARIN SODIUM (PORCINE) LOCK FLUSH IV SOLN 100 UNIT/ML 100 UNIT/ML
5 SOLUTION INTRAVENOUS
OUTPATIENT
Start: 2022-11-29

## 2022-11-29 RX ORDER — HEPARIN SODIUM,PORCINE 10 UNIT/ML
5 VIAL (ML) INTRAVENOUS
OUTPATIENT
Start: 2022-11-29